# Patient Record
Sex: FEMALE | Race: WHITE | Employment: OTHER | URBAN - METROPOLITAN AREA
[De-identification: names, ages, dates, MRNs, and addresses within clinical notes are randomized per-mention and may not be internally consistent; named-entity substitution may affect disease eponyms.]

---

## 2017-01-18 ENCOUNTER — OFFICE VISIT (OUTPATIENT)
Dept: FAMILY MEDICINE CLINIC | Age: 82
End: 2017-01-18

## 2017-01-18 VITALS
HEART RATE: 63 BPM | OXYGEN SATURATION: 98 % | WEIGHT: 126.4 LBS | BODY MASS INDEX: 23.26 KG/M2 | SYSTOLIC BLOOD PRESSURE: 144 MMHG | TEMPERATURE: 98.3 F | RESPIRATION RATE: 20 BRPM | DIASTOLIC BLOOD PRESSURE: 52 MMHG | HEIGHT: 62 IN

## 2017-01-18 DIAGNOSIS — E78.00 PURE HYPERCHOLESTEROLEMIA: ICD-10-CM

## 2017-01-18 DIAGNOSIS — I10 ESSENTIAL HYPERTENSION: Primary | ICD-10-CM

## 2017-01-18 DIAGNOSIS — E55.9 HYPOVITAMINOSIS D: ICD-10-CM

## 2017-01-18 NOTE — PROGRESS NOTES
Vladimir Pro is a 80 y.o. female  Chief Complaint   Patient presents with    Hypertension     6 months follow up     1. Have you been to the ER, urgent care clinic since your last visit? Hospitalized since your last visit? No    2. Have you seen or consulted any other health care providers outside of the [facility] since your last visit? Include any pap smears or colon screening.  No

## 2017-01-18 NOTE — PROGRESS NOTES
Assessment/Plan:    Sheryl Borges was seen today for hypertension. Diagnoses and all orders for this visit:    Essential hypertension    Pure hypercholesterolemia  -     CBC WITH AUTOMATED DIFF; Future  -     HEPATIC FUNCTION PANEL; Future  -     LIPID PANEL; Future  -     METABOLIC PANEL, BASIC; Future  -     TSH 3RD GENERATION; Future  -     T4, FREE; Future  -     URINALYSIS W/ RFLX MICROSCOPIC; Future  -     VITAMIN D, 25 HYDROXY; Future    Hypovitaminosis D  -     VITAMIN D, 25 HYDROXY; Future    Other orders  -     Cancel: Pneumococcal Conjugate vaccine - 13 valent (50 years and older)  -     Cancel: ADMIN PNEUMOCOCCAL VACCINE  Medicare Injection Admin Charge  -     pneumococcal 13 mey conj dip (PREVNAR-13) 0.5 mL syrg injection; 0.5 mL by IntraMUSCular route once for 1 dose. Pt will return for physical in July 2017. BW prior. The plan was discussed with the patient. The patient verbalized understanding and is in agreement with the plan. All medication potential side effects were discussed with the patient.    -------------------------------------------------------------------------------------------------------------------        Brenda Chaney is a 80 y.o. female and presents with Hypertension (6 months follow up)         Subjective:  Pt here for f/u. Doing well. HTN: well controlled. HLD: stable on labs. ROS:  Constitutional: No recent weight change. No weakness/fatigue. No f/c. Skin: No rashes, change in nails/hair, itching   HENT: No HA, dizziness. No hearing loss/tinnitus. No nasal congestion/discharge. Eyes: No change in vision, double/blurred vision or eye pain/redness. Cardiovascular: No CP/palpitations. No STEWART/orthopnea/PND. Respiratory: No cough/sputum, dyspnea, wheezing. Gastointestinal: No dysphagia, reflux. No n/v. No constipation/diarrhea. No melena/rectal bleeding. Genitourinary: No dysuria, urinary hesitancy, nocturia, hematuria.   No incontinence. Musculoskeletal: No joint pain/stiffness. No muscle pain/tenderness. Endo: No heat/cold intolerance, no polyuria/polydypsia. Heme: No h/o anemia. No easy bleeding/bruising. Allergy/Immunology: No seasonal rhinitis. Denies frequent colds, sinus/ear infections. Neurological: No seizures/numbness/weakness. No paresthesias. Psychiatric:  No depression, anxiety. The problem list was updated as a part of today's visit. 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 SH:  Social History   Substance Use Topics    Smoking status: Never Smoker    Smokeless tobacco: Never Used    Alcohol use No       Medications/Allergies:  Current Outpatient Prescriptions on File Prior to Visit   Medication Sig Dispense Refill    losartan (COZAAR) 100 mg tablet TAKE ONE TABLET BY MOUTH ONCE DAILY 90 Tab 1    sertraline (ZOLOFT) 25 mg tablet Take 1 Tab by mouth daily. 90 Tab 2    sotalol (BETAPACE) 80 mg tablet Take  by mouth daily.  Cholecalciferol, Vitamin D3, 1,000 unit cap Take  by mouth daily.  aspirin delayed-release 81 mg tablet Take  by mouth daily.  omega-3 fatty acids-vitamin e (FISH OIL) 1,000 mg Cap Take 1 Cap by mouth.  calcium-cholecalciferol, D3, (CALCIUM 600 + D) tablet Take 1 Tab by mouth daily.  latanoprost (XALATAN) 0.005 % ophthalmic solution Administer 1 Drop to both eyes nightly.  timolol (TIMOPTIC) 0.25 % ophthalmic solution Administer 1 Drop to both eyes two (2) times a day. No current facility-administered medications on file prior to visit.          No Known Allergies      Health Maintenance:   Health Maintenance   Topic Date Due    DTaP/Tdap/Td series (1 - Tdap) 01/31/1947    GLAUCOMA SCREENING Q2Y  01/31/1991    Pneumococcal 65+ Low/Medium Risk (2 of 2 - PPSV23) 04/16/2014    INFLUENZA AGE 9 TO ADULT  08/01/2016    MEDICARE YEARLY EXAM  07/28/2017    OSTEOPOROSIS SCREENING (DEXA)  Completed    ZOSTER VACCINE AGE 60>  Completed       Objective:  Visit Vitals    /52    Pulse 63    Temp 98.3 °F (36.8 °C) (Oral)    Resp 20    Ht 5' 2\" (1.575 m)    Wt 126 lb 6.4 oz (57.3 kg)    SpO2 98%    BMI 23.12 kg/m2          Nurses notes and VS reviewed. Physical Examination: General appearance - alert, well appearing, and in no distress  Chest - clear to auscultation, no wheezes, rales or rhonchi, symmetric air entry  Heart - normal rate, regular rhythm, normal S1, S2, no murmurs, rubs, clicks or gallops  Abdomen - soft, nontender, nondistended, no masses or organomegaly  Ext - no edema      Labwork and Ancillary Studies:    CBC w/Diff  Lab Results   Component Value Date/Time    WBC 11.7 07/20/2016 10:02 AM    HGB 13.3 07/20/2016 10:02 AM    PLATELET 437 06/01/0162 10:02 AM         Basic Metabolic Profile  Lab Results   Component Value Date/Time    Sodium 142 07/20/2016 10:02 AM    Potassium 5.1 07/20/2016 10:02 AM    Chloride 107 07/20/2016 10:02 AM    CO2 28 07/20/2016 10:02 AM    Anion gap 7 07/20/2016 10:02 AM    Glucose 102 07/20/2016 10:02 AM    BUN 26 07/20/2016 10:02 AM    Creatinine 1.32 07/20/2016 10:02 AM    BUN/Creatinine ratio 20 07/20/2016 10:02 AM    GFR est AA 46 07/20/2016 10:02 AM    GFR est non-AA 38 07/20/2016 10:02 AM    Calcium 9.5 07/20/2016 10:02 AM         LFT  Lab Results   Component Value Date/Time    ALT 22 07/20/2016 10:02 AM    AST 17 07/20/2016 10:02 AM    Alk.  phosphatase 75 07/20/2016 10:02 AM    Bilirubin, direct 0.1 07/20/2016 10:02 AM    Bilirubin, total 0.4 07/20/2016 10:02 AM         Cholesterol  Lab Results   Component Value Date/Time    Cholesterol, total 206 07/20/2016 10:02 AM    HDL Cholesterol 45 07/20/2016 10:02 AM    LDL, calculated 122.2 07/20/2016 10:02 AM    Triglyceride 194 07/20/2016 10:02 AM    CHOL/HDL Ratio 4.6 07/20/2016 10:02 AM

## 2017-01-18 NOTE — MR AVS SNAPSHOT
Visit Information Date & Time Provider Department Dept. Phone Encounter #  
 1/18/2017 10:15 AM Jak Ramesh, 3 [facility] [phone] [id number] Upcoming Health Maintenance Date Due DTaP/Tdap/Td series (1 - Tdap) 1/31/1947 GLAUCOMA SCREENING Q2Y 1/31/1991 Pneumococcal 65+ Low/Medium Risk (2 of 2 - PPSV23) 4/16/2014 INFLUENZA AGE 9 TO ADULT 8/1/2016 MEDICARE YEARLY EXAM 7/28/2017 Allergies as of 1/18/2017  Review Complete On: 1/18/2017 By: Jak Ramesh MD  
 No Known Allergies Current Immunizations  Reviewed on 1/18/2017 Name Date Influenza High Dose Vaccine PF 10/12/2016 Influenza Vaccine 9/23/2015, 12/16/2014, 10/16/2013 Pneumococcal Vaccine (Unspecified Type) 4/16/2009 Zoster Vaccine, Live 9/10/2014 Reviewed by Jak Ramesh MD on 1/18/2017 at 10:23 AM  
You Were Diagnosed With   
  
 Codes Comments Essential hypertension    -  Primary ICD-10-CM: I10 
ICD-9-CM: 401.9 Pure hypercholesterolemia     ICD-10-CM: E78.00 ICD-9-CM: 272.0 Hypovitaminosis D     ICD-10-CM: E55.9 ICD-9-CM: 268.9 Vitals BP Pulse Temp Resp Height(growth percentile) Weight(growth percentile) 144/52 63 98.3 °F (36.8 °C) (Oral) 20 5' 2\" (1.575 m) 126 lb 6.4 oz (57.3 kg) SpO2 BMI OB Status Smoking Status 98% 23.12 kg/m2 Postmenopausal Never Smoker Vitals History BMI and BSA Data Body Mass Index Body Surface Area  
 23.12 kg/m 2 1.58 m 2 Preferred Pharmacy Pharmacy Name Phone [facility] [phone] [facility] [phone] Your Updated Medication List  
  
   
This list is accurate as of: 1/18/17 10:32 AM.  Always use your most recent med list.  
  
  
  
  
 aspirin delayed-release 81 mg tablet Take  by mouth daily. Calcium 600 + D tablet Generic drug:  calcium-cholecalciferol (D3) Take 1 Tab by mouth daily. cholecalciferol 1,000 unit Cap Commonly known as:  VITAMIN D3 Take  by mouth daily. FISH OIL 1,000 mg Cap Generic drug:  omega-3 fatty acids-vitamin e Take 1 Cap by mouth.  
  
 losartan 100 mg tablet Commonly known as:  COZAAR  
TAKE ONE TABLET BY MOUTH ONCE DAILY pneumococcal 13 mey conj dip 0.5 mL Syrg injection Commonly known as:  PREVNAR-13  
0.5 mL by IntraMUSCular route once for 1 dose. sertraline 25 mg tablet Commonly known as:  ZOLOFT Take 1 Tab by mouth daily. sotalol 80 mg tablet Commonly known as:  Delle Lisa Take  by mouth daily. timolol 0.25 % ophthalmic solution Commonly known as:  TIMOPTIC Administer 1 Drop to both eyes two (2) times a day. XALATAN 0.005 % ophthalmic solution Generic drug:  latanoprost  
Administer 1 Drop to both eyes nightly. Prescriptions Printed Refills  
 pneumococcal 13 mey conj dip (PREVNAR-13) 0.5 mL syrg injection 0 Si.5 mL by IntraMUSCular route once for 1 dose. Class: Print Route: IntraMUSCular To-Do List   
 2017 Lab:  CBC WITH AUTOMATED DIFF   
  
 2017 Lab:  HEPATIC FUNCTION PANEL   
  
 2017 Lab:  LIPID PANEL   
  
 2017 Lab:  METABOLIC PANEL, BASIC   
  
 2017 Lab:  T4, FREE   
  
 2017 Lab:  TSH 3RD GENERATION   
  
 2017 Lab:  URINALYSIS W/ RFLX MICROSCOPIC   
  
 2017 Lab:  VITAMIN D, 25 HYDROXY Patient Instructions High Cholesterol: Care Instructions Your Care Instructions Cholesterol is a type of fat in your blood. It is needed for many body functions, such as making new cells. Cholesterol is made by your body. It also comes from food you eat. High cholesterol means that you have too much of the fat in your blood. This raises your risk of a heart attack and stroke. LDL and HDL are part of your total cholesterol.  LDL is the \"bad\" cholesterol. High LDL can raise your risk for heart disease, heart attack, and stroke. HDL is the \"good\" cholesterol. It helps clear bad cholesterol from the body. High HDL is linked with a lower risk of heart disease, heart attack, and stroke. Your cholesterol levels help your doctor find out your risk for having a heart attack or stroke. You and your doctor can talk about whether you need to lower your risk and what treatment is best for you. A heart-healthy lifestyle along with medicines can help lower your cholesterol and your risk. The way you choose to lower your risk will depend on how high your risk is for heart attack and stroke. It will also depend on how you feel about taking medicines. Follow-up care is a key part of your treatment and safety. Be sure to make and go to all appointments, and call your doctor if you are having problems. It's also a good idea to know your test results and keep a list of the medicines you take. How can you care for yourself at home? · Eat a variety of foods every day. Good choices include fruits, vegetables, whole grains (like oatmeal), dried beans and peas, nuts and seeds, soy products (like tofu), and fat-free or low-fat dairy products. · Replace butter, margarine, and hydrogenated or partially hydrogenated oils with olive and canola oils. (Canola oil margarine without trans fat is fine.) · Replace red meat with fish, poultry, and soy protein (like tofu). · Limit processed and packaged foods like chips, crackers, and cookies. · Bake, broil, or steam foods. Don't tripathi them. · Be physically active. Get at least 30 minutes of exercise on most days of the week. Walking is a good choice. You also may want to do other activities, such as running, swimming, cycling, or playing tennis or team sports. · Stay at a healthy weight or lose weight by making the changes in eating and physical activity listed above.  Losing just a small amount of weight, even 5 to 10 pounds, can reduce your risk for having a heart attack or stroke. · Do not smoke. When should you call for help? Watch closely for changes in your health, and be sure to contact your doctor if: 
· You need help making lifestyle changes. · You have questions about your medicine. Where can you learn more? Go to http://deanna-antionette.info/. Enter T876 in the search box to learn more about \"High Cholesterol: Care Instructions. \" Current as of: January 27, 2016 Content Version: 11.1 © 1041-4514 [employer]. Care instructions adapted under license by [facility] (which disclaims liability or warranty for this information). If you have questions about a medical condition or this instruction, always ask your healthcare professional. [address] any warranty or liability for your use of this information. Introducing [facility]! Cristina Day introduces [employer] patient portal. Now you can access parts of your medical record, email your doctor's office, and request medication refills online. 1. In your internet browser, go to [email or web address] 2. Click on the First Time User? Click Here link in the Sign In box. You will see the New Member Sign Up page. 3. Enter your Cubito Access Code exactly as it appears below. You will not need to use this code after youve completed the sign-up process. If you do not sign up before the expiration date, you must request a new code. · [employer] Access Code: [email or web address] Expires: 4/18/2017 10:32 AM 
 
4. Enter the last four digits of your Social Security Number (xxxx) and Date of Birth (mm/dd/yyyy) as indicated and click Submit. You will be taken to the next sign-up page. 5. Create a Cubito ID. This will be your Cubito login ID and cannot be changed, so think of one that is secure and easy to remember. 6. Create a Lover.ly password. You can change your password at any time. 7. Enter your Password Reset Question and Answer. This can be used at a later time if you forget your password. 8. Enter your e-mail address. You will receive e-mail notification when new information is available in [address] 9. Click Sign Up. You can now view and download portions of your medical record. 10. Click the Download Summary menu link to download a portable copy of your medical information. If you have questions, please visit the Frequently Asked Questions section of the [employer] website. Remember, [employer] is NOT to be used for urgent needs. For medical emergencies, dial 911. Now available from your iPhone and Android! Please provide this summary of care documentation to your next provider. Your primary care clinician is listed as Alona 51. If you have any questions after today's visit, please call [phone].

## 2017-01-18 NOTE — PATIENT INSTRUCTIONS

## 2017-01-26 ENCOUNTER — TELEPHONE (OUTPATIENT)
Dept: FAMILY MEDICINE CLINIC | Age: 82
End: 2017-01-26

## 2017-01-27 ENCOUNTER — TELEPHONE (OUTPATIENT)
Dept: FAMILY MEDICINE CLINIC | Age: 82
End: 2017-01-27

## 2017-02-02 ENCOUNTER — TELEPHONE (OUTPATIENT)
Dept: FAMILY MEDICINE CLINIC | Age: 82
End: 2017-02-02

## 2017-02-02 NOTE — TELEPHONE ENCOUNTER
[facility] called in regards to an pneumonia vaccine; Ravin Dayana John is the contact and she will only be at the office till 6 pm; Mrs. Dayana Lopez [phone]

## 2017-06-22 ENCOUNTER — IMPORTED ENCOUNTER (OUTPATIENT)
Dept: URBAN - METROPOLITAN AREA CLINIC 1 | Facility: CLINIC | Age: 82
End: 2017-06-22

## 2017-06-22 PROBLEM — H04.123: Noted: 2017-06-22

## 2017-06-22 PROBLEM — H26.493: Noted: 2017-06-22

## 2017-06-22 PROBLEM — H40.1133: Noted: 2017-06-22

## 2017-06-22 PROBLEM — Z96.1: Noted: 2017-06-22

## 2017-06-22 PROBLEM — H16.143: Noted: 2017-06-22

## 2017-06-22 PROCEDURE — 92012 INTRM OPH EXAM EST PATIENT: CPT

## 2017-06-22 PROCEDURE — 92133 CPTRZD OPH DX IMG PST SGM ON: CPT

## 2017-06-22 NOTE — PATIENT DISCUSSION
1.  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 Advanced thinning by OCT OU but quality of OCT appears relatively poor OU. PT using Cosopt OU BID ok for pt to continue Cosopt OU BID. Pt to continue Latanoprost OU QHS. Patient advised to be compliant with gtts. Condition was discussed with patient and patient understands. Will continue to monitor patient for any progression in condition. Patient was advised to call us with any problems questions or concerns. 2.  PCO OU: Observe and consider yag cap when pt feels pco visually significant and visual acuity decreases to appropriate level. 3. PETER w/ decrease PEK OU- Improving. The continuation of artificial tears were recommended. 4.  Pseudophakia OU- Doing well5. H/o [place] OU6. H/o Gr 1 HTN Ret OU7. Return for an appointment for a 30 in 6 months with Dr. Tessa Carpenter.

## 2017-06-27 ENCOUNTER — TELEPHONE (OUTPATIENT)
Dept: FAMILY MEDICINE CLINIC | Age: 82
End: 2017-06-27

## 2017-06-27 NOTE — TELEPHONE ENCOUNTER
Patient usually have a regular bowel movement, however it doesn't look normal for the last two days. Patient's feeling a little pressure when she's pooping but added that she did not have any abdominal pain. Patient and her daughter would like to know if she could get an over the counter medication for stool softener. Asking to be called back.

## 2017-06-27 NOTE — TELEPHONE ENCOUNTER
Call placed to patient, patient's daughter informed that patient can use OTC colace. If that does not help she can call for f/u appt with Dr Sheyla Sanchez.

## 2017-07-10 ENCOUNTER — HOSPITAL ENCOUNTER (OUTPATIENT)
Dept: LAB | Age: 82
Discharge: HOME | End: 2017-07-10
Payer: MEDICARE

## 2017-07-10 DIAGNOSIS — E78.00 PURE HYPERCHOLESTEROLEMIA: ICD-10-CM

## 2017-07-10 LAB
ALBUMIN SERPL BCP-MCNC: 3.4 G/DL (ref 3.4–5)
ALBUMIN/GLOB SERPL: 0.9 {RATIO} (ref 0.8–1.7)
ALP SERPL-CCNC: 62 U/L (ref 45–117)
ALT SERPL-CCNC: 19 U/L (ref 13–56)
ANION GAP BLD CALC-SCNC: 8 MMOL/L (ref 3–18)
APPEARANCE UR: CLEAR
AST SERPL W P-5'-P-CCNC: 16 U/L (ref 15–37)
BACTERIA URNS QL MICRO: NEGATIVE /HPF
BASOPHILS # BLD AUTO: 0 K/UL (ref 0–0.06)
BASOPHILS # BLD: 0 % (ref 0–2)
BILIRUB DIRECT SERPL-MCNC: <0.1 MG/DL (ref 0–0.2)
BILIRUB SERPL-MCNC: 0.2 MG/DL (ref 0.2–1)
BILIRUB UR QL: NEGATIVE
BUN SERPL-MCNC: 23 MG/DL (ref 7–18)
BUN/CREAT SERPL: 16 (ref 12–20)
CALCIUM SERPL-MCNC: 9 MG/DL (ref 8.5–10.1)
CHLORIDE SERPL-SCNC: 105 MMOL/L (ref 100–108)
CHOLEST SERPL-MCNC: 201 MG/DL
CO2 SERPL-SCNC: 27 MMOL/L (ref 21–32)
COLOR UR: YELLOW
CREAT SERPL-MCNC: 1.48 MG/DL (ref 0.6–1.3)
DIFFERENTIAL METHOD BLD: ABNORMAL
EOSINOPHIL # BLD: 0.1 K/UL (ref 0–0.4)
EOSINOPHIL NFR BLD: 1 % (ref 0–5)
EPITH CASTS URNS QL MICRO: NORMAL /LPF (ref 0–5)
ERYTHROCYTE [DISTWIDTH] IN BLOOD BY AUTOMATED COUNT: 14 % (ref 11.6–14.5)
GLOBULIN SER CALC-MCNC: 3.6 G/DL (ref 2–4)
GLUCOSE SERPL-MCNC: 123 MG/DL (ref 74–99)
GLUCOSE UR STRIP.AUTO-MCNC: NEGATIVE MG/DL
HCT VFR BLD AUTO: 41.6 % (ref 35–45)
HDLC SERPL-MCNC: 47 MG/DL (ref 40–60)
HDLC SERPL: 4.3 {RATIO} (ref 0–5)
HGB BLD-MCNC: 13.4 G/DL (ref 12–16)
HGB UR QL STRIP: NEGATIVE
KETONES UR QL STRIP.AUTO: NEGATIVE MG/DL
LDLC SERPL CALC-MCNC: 123.6 MG/DL (ref 0–100)
LEUKOCYTE ESTERASE UR QL STRIP.AUTO: ABNORMAL
LIPID PROFILE,FLP: ABNORMAL
LYMPHOCYTES # BLD AUTO: 18 % (ref 21–52)
LYMPHOCYTES # BLD: 2.3 K/UL (ref 0.9–3.6)
MCH RBC QN AUTO: 29.6 PG (ref 24–34)
MCHC RBC AUTO-ENTMCNC: 32.2 G/DL (ref 31–37)
MCV RBC AUTO: 91.8 FL (ref 74–97)
MONOCYTES # BLD: 0.9 K/UL (ref 0.05–1.2)
MONOCYTES NFR BLD AUTO: 7 % (ref 3–10)
NEUTS SEG # BLD: 9.4 K/UL (ref 1.8–8)
NEUTS SEG NFR BLD AUTO: 74 % (ref 40–73)
NITRITE UR QL STRIP.AUTO: NEGATIVE
PH UR STRIP: 6 [PH] (ref 5–8)
PLATELET # BLD AUTO: 225 K/UL (ref 135–420)
PMV BLD AUTO: 11.2 FL (ref 9.2–11.8)
POTASSIUM SERPL-SCNC: 4.9 MMOL/L (ref 3.5–5.5)
PROT SERPL-MCNC: 7 G/DL (ref 6.4–8.2)
PROT UR STRIP-MCNC: NEGATIVE MG/DL
RBC # BLD AUTO: 4.53 M/UL (ref 4.2–5.3)
RBC #/AREA URNS HPF: 0 /HPF (ref 0–5)
SODIUM SERPL-SCNC: 140 MMOL/L (ref 136–145)
SP GR UR REFRACTOMETRY: 1.01 (ref 1–1.03)
T4 FREE SERPL-MCNC: 1.1 NG/DL (ref 0.7–1.5)
TRIGL SERPL-MCNC: 152 MG/DL (ref ?–150)
TSH SERPL DL<=0.05 MIU/L-ACNC: 2.93 UIU/ML (ref 0.36–3.74)
UROBILINOGEN UR QL STRIP.AUTO: 0.2 EU/DL (ref 0.2–1)
VLDLC SERPL CALC-MCNC: 30.4 MG/DL
WBC # BLD AUTO: 12.8 K/UL (ref 4.6–13.2)
WBC URNS QL MICRO: NORMAL /HPF (ref 0–4)

## 2017-07-10 PROCEDURE — 84443 ASSAY THYROID STIM HORMONE: CPT | Performed by: INTERNAL MEDICINE

## 2017-07-10 PROCEDURE — 84439 ASSAY OF FREE THYROXINE: CPT | Performed by: INTERNAL MEDICINE

## 2017-07-10 PROCEDURE — 80061 LIPID PANEL: CPT | Performed by: INTERNAL MEDICINE

## 2017-07-10 PROCEDURE — 36415 COLL VENOUS BLD VENIPUNCTURE: CPT | Performed by: INTERNAL MEDICINE

## 2017-07-10 PROCEDURE — 80076 HEPATIC FUNCTION PANEL: CPT | Performed by: INTERNAL MEDICINE

## 2017-07-10 PROCEDURE — 80048 BASIC METABOLIC PNL TOTAL CA: CPT | Performed by: INTERNAL MEDICINE

## 2017-07-10 PROCEDURE — 85025 COMPLETE CBC W/AUTO DIFF WBC: CPT | Performed by: INTERNAL MEDICINE

## 2017-07-10 PROCEDURE — 81001 URINALYSIS AUTO W/SCOPE: CPT | Performed by: INTERNAL MEDICINE

## 2017-07-17 ENCOUNTER — TELEPHONE (OUTPATIENT)
Dept: FAMILY MEDICINE CLINIC | Age: 82
End: 2017-07-17

## 2017-07-17 NOTE — TELEPHONE ENCOUNTER
Caleb Amin from [address] [place] [address]  is requesting to get patient's recent office visit notes and labs. There was no information that patient was ever referred to them, however they have been seeing the patient for quite sometime now. Ms. Caleb Amin added that they send notes every year to us whenever see they see the patient.  Asking to be faxed at [phone]

## 2017-07-19 ENCOUNTER — TELEPHONE (OUTPATIENT)
Dept: FAMILY MEDICINE CLINIC | Age: 82
End: 2017-07-19

## 2017-07-19 ENCOUNTER — OFFICE VISIT (OUTPATIENT)
Dept: FAMILY MEDICINE CLINIC | Age: 82
End: 2017-07-19

## 2017-07-19 VITALS
WEIGHT: 121 LBS | TEMPERATURE: 98.3 F | OXYGEN SATURATION: 96 % | DIASTOLIC BLOOD PRESSURE: 72 MMHG | HEIGHT: 62 IN | HEART RATE: 63 BPM | RESPIRATION RATE: 16 BRPM | SYSTOLIC BLOOD PRESSURE: 178 MMHG | BODY MASS INDEX: 22.26 KG/M2

## 2017-07-19 DIAGNOSIS — I10 ESSENTIAL HYPERTENSION: Primary | ICD-10-CM

## 2017-07-19 DIAGNOSIS — Z13.6 SCREENING FOR ISCHEMIC HEART DISEASE: ICD-10-CM

## 2017-07-19 DIAGNOSIS — Z13.1 SCREENING FOR DIABETES MELLITUS: ICD-10-CM

## 2017-07-19 DIAGNOSIS — Z00.00 ROUTINE GENERAL MEDICAL EXAMINATION AT A HEALTH CARE FACILITY: ICD-10-CM

## 2017-07-19 DIAGNOSIS — Z13.39 SCREENING FOR ALCOHOLISM: ICD-10-CM

## 2017-07-19 DIAGNOSIS — E78.00 PURE HYPERCHOLESTEROLEMIA: ICD-10-CM

## 2017-07-19 NOTE — PROGRESS NOTES
This is a Subsequent Medicare Annual Wellness Visit providing Personalized Prevention Plan Services (PPPS) (Performed 12 months after initial AWV and PPPS )    I have reviewed the patient's medical history in detail and updated the computerized patient record. 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  Cholecalciferol, Vitamin D3, 1,000 unit cap Take  by mouth daily.  aspirin delayed-release 81 mg tablet Take  by mouth daily.  omega-3 fatty acids-vitamin e (FISH OIL) 1,000 mg Cap Take 1 Cap by mouth.  calcium-cholecalciferol, D3, (CALCIUM 600 + D) tablet Take 1 Tab by mouth daily.  latanoprost (XALATAN) 0.005 % ophthalmic solution Administer 1 Drop to both eyes nightly.  timolol (TIMOPTIC) 0.25 % ophthalmic solution Administer 1 Drop to both eyes two (2) times a day.        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 No    Do you average more than 7 drinks per week? No        Functional Ability and Level of Safety:     Hearing Loss   mild    Activities of Daily Living   Self-care. Requires assistance with: no ADLs    Fall Risk   Fall Risk Assessment, last 12 mths 7/19/2017   Able to walk? Yes   Fall in past 12 months? No   Fall with injury? -   Number of falls in past 12 months -     Abuse Screen   Patient is not abused    Review of Systems   Pertinent items are noted in HPI. Physical Examination     Evaluation of Cognitive Function:  Mood/affect:  happy  Appearance: age appropriate  Family member/caregiver input: none    Visit Vitals    /70    Pulse 63    Temp 98.3 °F (36.8 °C)    Resp 16    Ht 5' 2\" (1.575 m)    Wt 121 lb (54.9 kg)    SpO2 96%    BMI 22.13 kg/m2     General appearance: alert, cooperative, no distress, appears stated age  Head: Normocephalic, without obvious abnormality, atraumatic  Ears: normal TM's and external ear canals AU  Throat: Lips, mucosa, and tongue normal. Teeth and gums normal  Neck: supple, symmetrical, trachea midline, no adenopathy, thyroid: not enlarged, symmetric, no tenderness/mass/nodules, no carotid bruit and no JVD  Back: symmetric, no curvature. ROM normal. No CVA tenderness. Lungs: clear to auscultation bilaterally  Heart: regular rate and rhythm, S1, S2 normal, no murmur, click, rub or gallop  Abdomen: soft, non-tender.  Bowel sounds normal. No masses,  no organomegaly  Extremities: extremities normal, atraumatic, no cyanosis or edema  Pulses: 2+ and symmetric    Patient Care Team:  Nay Riddle MD as PCP - General  [facility] Anshul Diaz MD (Cardiology)  Katrina Carroll, LUCI as Ambulatory Care Navigator    Advice/Referrals/Counseling   Education and counseling provided:  Are appropriate based on today's review and evaluation      Assessment/Plan       ICD-10-CM ICD-9-CM    1. Routine general medical examination at a health care facility Z00.00 V70.0    2. Screening for alcoholism Z13.89 V79.1    3. Screening for diabetes mellitus Z13.1 V77.1    4. Screening for ischemic heart disease Z13.6 V81.0    5. Pure hypercholesterolemia E78.00 272.0    6. Essential hypertension I10 401.9    .

## 2017-07-19 NOTE — TELEPHONE ENCOUNTER
Please find out if cards adjusted her BP meds on the visit or not. If her readings do not go down, we will need to add something.

## 2017-07-19 NOTE — PROGRESS NOTES
Assessment/Plan:    Lynnette Mann was seen today for annual wellness visit. Diagnoses and all orders for this visit:    Essential hypertension    Routine general medical examination at a health care facility    Screening for alcoholism    Screening for diabetes mellitus    Screening for ischemic heart disease    Pure hypercholesterolemia        F/u 3 weeks for BP. She will be seeing her cardiologist this afternoon. I advised her to take note of the reading and let us know. Will not change her medications yet. The plan was discussed with the patient. The patient verbalized understanding and is in agreement with the plan. All medication potential side effects were discussed with the patient.    -------------------------------------------------------------------------------------------------------------------        Kitty Finley is a 80 y.o. female and presents with Annual Wellness Visit         Subjective:  Pt here for f/u. HTN: still elevated. HLD; TGs are better this time. Other lipid parameters are fine. ROS:  Constitutional: No recent weight change. No weakness/fatigue. No f/c. Skin: No rashes, change in nails/hair, itching   HENT: No HA, dizziness. No hearing loss/tinnitus. No nasal congestion/discharge. Eyes: No change in vision, double/blurred vision or eye pain/redness. Cardiovascular: No CP/palpitations. No STEWART/orthopnea/PND. Respiratory: No cough/sputum, dyspnea, wheezing. Gastointestinal: No dysphagia, reflux. No n/v. No constipation/diarrhea. No melena/rectal bleeding. Genitourinary: No dysuria, urinary hesitancy, nocturia, hematuria. No incontinence. Musculoskeletal: No joint pain/stiffness. No muscle pain/tenderness. Endo: No heat/cold intolerance, no polyuria/polydypsia. Heme: No h/o anemia. No easy bleeding/bruising. Allergy/Immunology: No seasonal rhinitis. Denies frequent colds, sinus/ear infections. Neurological: No seizures/numbness/weakness.   No paresthesias. Psychiatric:  No depression, anxiety. The problem list was updated as a part of today's visit. 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 SH:  Social History   Substance Use Topics    Smoking status: Never Smoker    Smokeless tobacco: Never Used    Alcohol use No       Medications/Allergies:  Current Outpatient Prescriptions on File Prior to Visit   Medication Sig Dispense Refill    sertraline (ZOLOFT) 25 mg tablet TAKE ONE TABLET BY MOUTH ONCE DAILY 90 Tab 1    losartan (COZAAR) 100 mg tablet TAKE ONE TABLET BY MOUTH ONCE DAILY 90 Tab 1    sotalol (BETAPACE) 80 mg tablet Take  by mouth daily.  Cholecalciferol, Vitamin D3, 1,000 unit cap Take  by mouth daily.  aspirin delayed-release 81 mg tablet Take  by mouth daily.  omega-3 fatty acids-vitamin e (FISH OIL) 1,000 mg Cap Take 1 Cap by mouth.  calcium-cholecalciferol, D3, (CALCIUM 600 + D) tablet Take 1 Tab by mouth daily.  latanoprost (XALATAN) 0.005 % ophthalmic solution Administer 1 Drop to both eyes nightly.  timolol (TIMOPTIC) 0.25 % ophthalmic solution Administer 1 Drop to both eyes two (2) times a day. No current facility-administered medications on file prior to visit.          No Known Allergies      Health Maintenance:   Health Maintenance   Topic Date Due    GLAUCOMA SCREENING Q2Y  01/31/1991    MEDICARE YEARLY EXAM  07/28/2017    INFLUENZA AGE 9 TO ADULT  08/01/2017    DTaP/Tdap/Td series (2 - Td) 07/19/2027    OSTEOPOROSIS SCREENING (DEXA)  Completed    ZOSTER VACCINE AGE 60>  Completed    Pneumococcal 65+ Low/Medium Risk  Completed Objective:  Visit Vitals    /72    Pulse 63    Temp 98.3 °F (36.8 °C)    Resp 16    Ht 5' 2\" (1.575 m)    Wt 121 lb (54.9 kg)    SpO2 96%    BMI 22.13 kg/m2          Nurses notes and VS reviewed. Physical Examination: see other note. Labwork and Ancillary Studies:    CBC w/Diff  Lab Results   Component Value Date/Time    WBC 12.8 07/10/2017 09:12 AM    HGB 13.4 07/10/2017 09:12 AM    PLATELET 046 53/96/7585 09:12 AM         Basic Metabolic Profile  Lab Results   Component Value Date/Time    Sodium 140 07/10/2017 09:12 AM    Potassium 4.9 07/10/2017 09:12 AM    Chloride 105 07/10/2017 09:12 AM    CO2 27 07/10/2017 09:12 AM    Anion gap 8 07/10/2017 09:12 AM    Glucose 123 07/10/2017 09:12 AM    BUN 23 07/10/2017 09:12 AM    Creatinine 1.48 07/10/2017 09:12 AM    BUN/Creatinine ratio 16 07/10/2017 09:12 AM    GFR est AA 40 07/10/2017 09:12 AM    GFR est non-AA 33 07/10/2017 09:12 AM    Calcium 9.0 07/10/2017 09:12 AM         LFT  Lab Results   Component Value Date/Time    ALT (SGPT) 19 07/10/2017 09:12 AM    AST (SGOT) 16 07/10/2017 09:12 AM    Alk.  phosphatase 62 07/10/2017 09:12 AM    Bilirubin, direct <0.1 07/10/2017 09:12 AM    Bilirubin, total 0.2 07/10/2017 09:12 AM         Cholesterol  Lab Results   Component Value Date/Time    Cholesterol, total 201 07/10/2017 09:12 AM    HDL Cholesterol 47 07/10/2017 09:12 AM    LDL, calculated 123.6 07/10/2017 09:12 AM    Triglyceride 152 07/10/2017 09:12 AM    CHOL/HDL Ratio 4.3 07/10/2017 09:12 AM

## 2017-07-19 NOTE — TELEPHONE ENCOUNTER
Pt's daughter called on behalf of the pt to report her bp readings. They took her bp readings after she was done with her Cardiologist at 2:55pm it was 186/72 slightly higher than when she was here with us this morning. Dr Kiera Quintero will be sending DR Cruz Oppenheim a letter about it as well.      He has her scheduled for an echocardiogram for this Tuesday, July [facility]th at [facility]

## 2017-07-19 NOTE — MR AVS SNAPSHOT
Visit Information Date & Time Provider Department Dept. Phone Encounter #  
 7/19/2017 11:00 AM Raj Woodard, [facility] [phone] [id number] Follow-up Instructions Return in about 6 months (around 1/19/2018). Upcoming Health Maintenance Date Due  
 GLAUCOMA SCREENING Q2Y 1/31/1991 MEDICARE YEARLY EXAM 7/28/2017 INFLUENZA AGE 9 TO ADULT 8/1/2017 DTaP/Tdap/Td series (2 - Td) 7/19/2027 Allergies as of 7/19/2017  Review Complete On: 7/19/2017 By: Raj Woodard MD  
 No Known Allergies Current Immunizations  Reviewed on 1/18/2017 Name Date Influenza High Dose Vaccine PF 10/12/2016 Influenza Vaccine 9/23/2015, 12/16/2014, 10/16/2013 Pneumococcal Conjugate (PCV-13) 1/18/2017 Pneumococcal Vaccine (Unspecified Type) 4/16/2009 Zoster Vaccine, Live 9/10/2014 Not reviewed this visit You Were Diagnosed With   
  
 Codes Comments Routine general medical examination at a health care facility     ICD-10-CM: Z00.00 ICD-9-CM: V70.0 Screening for alcoholism     ICD-10-CM: Z13.89 ICD-9-CM: V79.1 Screening for diabetes mellitus     ICD-10-CM: Z13.1 ICD-9-CM: V77.1 Screening for ischemic heart disease     ICD-10-CM: Z13.6 ICD-9-CM: V81.0 Vitals BP Pulse Temp Resp Height(growth percentile) Weight(growth percentile) 180/70 63 98.3 °F (36.8 °C) 16 5' 2\" (1.575 m) 121 lb (54.9 kg) SpO2 BMI OB Status Smoking Status 96% 22.13 kg/m2 Postmenopausal Never Smoker Vitals History BMI and BSA Data Body Mass Index Body Surface Area  
 22.13 kg/m 2 1.55 m 2 Preferred Pharmacy Pharmacy Name Phone [facility] [address] [facility] [phone] Your Updated Medication List  
  
   
This list is accurate as of: 7/19/17 11:43 AM.  Always use your most recent med list.  
  
  
  
  
 aspirin delayed-release 81 mg tablet Take  by mouth daily. Calcium 600 + D tablet Generic drug:  calcium-cholecalciferol (D3) Take 1 Tab by mouth daily. cholecalciferol 1,000 unit Cap Commonly known as:  VITAMIN D3 Take  by mouth daily. FISH OIL 1,000 mg Cap Generic drug:  omega-3 fatty acids-vitamin e Take 1 Cap by mouth.  
  
 losartan 100 mg tablet Commonly known as:  COZAAR  
TAKE ONE TABLET BY MOUTH ONCE DAILY  
  
 sertraline 25 mg tablet Commonly known as:  ZOLOFT  
TAKE ONE TABLET BY MOUTH ONCE DAILY  
  
 sotalol 80 mg tablet Commonly known as:  Geno Showers Take  by mouth daily. timolol 0.25 % ophthalmic solution Commonly known as:  TIMOPTIC Administer 1 Drop to both eyes two (2) times a day. XALATAN 0.005 % ophthalmic solution Generic drug:  latanoprost  
Administer 1 Drop to both eyes nightly. Follow-up Instructions Return in about 6 months (around 1/19/2018). Patient Instructions Medicare Part B Preventive Services Limitations Recommendation Scheduled Bone Mass Measurement 
(age 72 & older, biennial) Requires diagnosis related to osteoporosis or estrogen deficiency. Biennial benefit unless patient has history of long-term glucocorticoid tx or baseline is needed because initial test was by other method Cardiovascular Screening Blood Tests (every 5 years) Total cholesterol, HDL, Triglycerides Order as a panel if possible Colorectal Cancer Screening 
-Fecal occult blood test (annual) -Flexible sigmoidoscopy (5y) 
-Screening colonoscopy (10y) -Barium Enema Counseling to Prevent Tobacco Use (up to 8 sessions per year) - Counseling greater than 3 and up to 10 minutes - Counseling greater than 10 minutes Patients must be asymptomatic of tobacco-related conditions to receive as preventive service Diabetes Screening Tests (at least every 3 years, Medicare covers annually or at 6-month intervals for prediabetic patients) Fasting blood sugar (FBS) or glucose tolerance test (GTT) Patient must be diagnosed with one of the following: 
-Hypertension, Dyslipidemia, obesity, previous impaired FBS or GTT 
Or any two of the following: overweight, FH of diabetes, age ? 72, history of gestational diabetes, birth of baby weighing more than 9 pounds Diabetes Self-Management Training (DSMT) (no USPSTF recommendation) Requires referral by treating physician for patient with diabetes or renal disease. 10 hours of initial DSMT session of no less than 30 minutes each in a continuous 12-month period. 2 hours of follow-up DSMT in subsequent years. Glaucoma Screening (no USPSTF recommendation) Diabetes mellitus, family history, , age 48 or over,  American, age 72 or over Human Immunodeficiency Virus (HIV) Screening (annually for increased risk patients) HIV-1 and HIV-2 by EIA, FREYA, rapid antibody test, or oral mucosa transudate Patient must be at increased risk for HIV infection per USPSTF guidelines or pregnant. Tests covered annually for patients at increased risk. Pregnant patients may receive up to 3 test during pregnancy. Medical Nutrition Therapy (MNT) (for diabetes or renal disease not recommended schedule) Requires referral by treating physician for patient with diabetes or renal disease. Can be provided in same year as diabetes self-management training (DSMT), and CMS recommends medical nutrition therapy take place after DSMT. Up to 3 hours for initial year and 2 hours in subsequent years. Shingles Vaccination A shingles vaccine is also recommended once in a lifetime after age 61 Seasonal Influenza Vaccination (annually) Pneumococcal Vaccination (once after 65) Hepatitis B Vaccinations (if medium/high risk) Medium/high risk factors:  End-stage renal disease, Hemophiliacs who received Factor VIII or IX concentrates, Clients of institutions for the mentally retarded, Persons who live in the same house as a HepB virus carrier, Homosexual men, Illicit injectable drug abusers. Screening Mammography (biennial age 54-69) Annually (age 36 or over) Screening Pap Tests and Pelvic Examination (up to age 79 and after 79 if unknown history or abnormal study last 10 years) Every 24 months except high risk Ultrasound Screening for Abdominal Aortic Aneurysm (AAA) (once) Patient must be referred through [facility] and not have had a screening for abdominal aortic aneurysm before under Medicare. Limited to patients who meet one of the following criteria: 
- Men who are 73-68 years old and have smoked more than 100 cigarettes in their lifetime. 
-Anyone with a FH of AAA 
-Anyone recommended for screening by USPSTF Introducing [facility]! [employer] introduces [employer] patient portal. Now you can access parts of your medical record, email your doctor's office, and request medication refills online. 1. In your internet browser, go to [email or web address] 2. Click on the First Time User? Click Here link in the Sign In box. You will see the New Member Sign Up page. 3. Enter your [employer] Access Code exactly as it appears below. You will not need to use this code after youve completed the sign-up process. If you do not sign up before the expiration date, you must request a new code. · [employer] Access Code: [email or web address] Expires: 10/17/2017 11:43 AM 
 
4. Enter the last four digits of your Social Security Number (xxxx) and Date of Birth (mm/dd/yyyy) as indicated and click Submit. You will be taken to the next sign-up page. 5. Create a Allurentt ID. This will be your [employer] login ID and cannot be changed, so think of one that is secure and easy to remember. 6. Create a [employer] password. You can change your password at any time. 7. Enter your Password Reset Question and Answer.  This can be used at a later time if you forget your password. 8. Enter your e-mail address. You will receive e-mail notification when new information is available in [address] 9. Click Sign Up. You can now view and download portions of your medical record. 10. Click the Download Summary menu link to download a portable copy of your medical information. If you have questions, please visit the Frequently Asked Questions section of the [employer] website. Remember, [employer] is NOT to be used for urgent needs. For medical emergencies, dial 911. Now available from your iPhone and Android! Please provide this summary of care documentation to your next provider. Your primary care clinician is listed as Alona [address]. If you have any questions after today's visit, please call [phone].

## 2017-07-19 NOTE — PATIENT INSTRUCTIONS
Medicare Part B Preventive Services Limitations Recommendation Scheduled   Bone Mass Measurement  (age 72 & older, biennial) Requires diagnosis related to osteoporosis or estrogen deficiency. Biennial benefit unless patient has history of long-term glucocorticoid tx or baseline is needed because initial test was by other method     Cardiovascular Screening Blood Tests (every 5 years)  Total cholesterol, HDL, Triglycerides Order as a panel if possible     Colorectal Cancer Screening  -Fecal occult blood test (annual)  -Flexible sigmoidoscopy (5y)  -Screening colonoscopy (10y)  -Barium Enema      Counseling to Prevent Tobacco Use (up to 8 sessions per year)  - Counseling greater than 3 and up to 10 minutes  - Counseling greater than 10 minutes Patients must be asymptomatic of tobacco-related conditions to receive as preventive service     Diabetes Screening Tests (at least every 3 years, Medicare covers annually or at 6-month intervals for prediabetic patients)    Fasting blood sugar (FBS) or glucose tolerance test (GTT) Patient must be diagnosed with one of the following:  -Hypertension, Dyslipidemia, obesity, previous impaired FBS or GTT  Or any two of the following: overweight, FH of diabetes, age ? 72, history of gestational diabetes, birth of baby weighing more than 9 pounds     Diabetes Self-Management Training (DSMT) (no USPSTF recommendation) Requires referral by treating physician for patient with diabetes or renal disease. 10 hours of initial DSMT session of no less than 30 minutes each in a continuous 12-month period. 2 hours of follow-up DSMT in subsequent years.      Glaucoma Screening (no USPSTF recommendation) Diabetes mellitus, family history, , age 48 or over,  American, age 72 or over     Human Immunodeficiency Virus (HIV) Screening (annually for increased risk patients)  HIV-1 and HIV-2 by EIA, FREYA, rapid antibody test, or oral mucosa transudate Patient must be at increased risk for HIV infection per USPSTF guidelines or pregnant. Tests covered annually for patients at increased risk. Pregnant patients may receive up to 3 test during pregnancy. Medical Nutrition Therapy (MNT) (for diabetes or renal disease not recommended schedule) Requires referral by treating physician for patient with diabetes or renal disease. Can be provided in same year as diabetes self-management training (DSMT), and CMS recommends medical nutrition therapy take place after DSMT. Up to 3 hours for initial year and 2 hours in subsequent years. Shingles Vaccination A shingles vaccine is also recommended once in a lifetime after age 61     Seasonal Influenza Vaccination (annually)      Pneumococcal Vaccination (once after 72)      Hepatitis B Vaccinations (if medium/high risk) Medium/high risk factors:  End-stage renal disease,  Hemophiliacs who received Factor VIII or IX concentrates, Clients of institutions for the mentally retarded, Persons who live in the same house as a HepB virus carrier, Homosexual men, Illicit injectable drug abusers. Screening Mammography (biennial age 54-69) Annually (age 36 or over)     Screening Pap Tests and Pelvic Examination (up to age 79 and after 79 if unknown history or abnormal study last 10 years) Every 25 months except high risk     Ultrasound Screening for Abdominal Aortic Aneurysm (AAA) (once) Patient must be referred through [facility] and not have had a screening for abdominal aortic aneurysm before under Medicare.   Limited to patients who meet one of the following criteria:  - Men who are 73-68 years old and have smoked more than 100 cigarettes in their lifetime.  -Anyone with a FH of AAA  -Anyone recommended for screening by USPSTF

## 2017-07-19 NOTE — PROGRESS NOTES
Chin Powell is a 80 y.o. female here today for medicare wellness visit. 1. Have you been to the ER, urgent care clinic since your last visit? Hospitalized since your last visit? No    2. Have you seen or consulted any other health care providers outside of the [address] [place] [address] since your last visit? Include any pap smears or colon screening.  No

## 2017-07-20 NOTE — TELEPHONE ENCOUNTER
Call placed to patient, they have not done anything with patient's med, but patient is going for echo Tuesday and MD will notify you of results. Patient to see you August 9.

## 2017-08-09 ENCOUNTER — OFFICE VISIT (OUTPATIENT)
Dept: FAMILY MEDICINE CLINIC | Age: 82
End: 2017-08-09

## 2017-08-09 VITALS
HEIGHT: 62 IN | SYSTOLIC BLOOD PRESSURE: 132 MMHG | OXYGEN SATURATION: 97 % | HEART RATE: 60 BPM | DIASTOLIC BLOOD PRESSURE: 54 MMHG | TEMPERATURE: 98.1 F | RESPIRATION RATE: 18 BRPM | WEIGHT: 120 LBS | BODY MASS INDEX: 22.08 KG/M2

## 2017-08-09 DIAGNOSIS — I10 ESSENTIAL HYPERTENSION: Primary | ICD-10-CM

## 2017-08-09 NOTE — PROGRESS NOTES
Assessment/Plan:    *Diagnoses and all orders for this visit:    1. Essential hypertension      F/u in mid Jan for 6 mon f/u. The plan was discussed with the patient. The patient verbalized understanding and is in agreement with the plan. All medication potential side effects were discussed with the patient.    -------------------------------------------------------------------------------------------------------------------        Julissa Ospina is a 80 y.o. female and presents with Hypertension         Subjective:  Pt here for f/u of HTN. She brought BP log from home and readings are even lower. ROS:  Constitutional: No recent weight change. No weakness/fatigue. No f/c. Skin: No rashes, change in nails/hair, itching   HENT: No HA, dizziness. No hearing loss/tinnitus. No nasal congestion/discharge. Eyes: No change in vision, double/blurred vision or eye pain/redness. Cardiovascular: No CP/palpitations. No STEWART/orthopnea/PND. Respiratory: No cough/sputum, dyspnea, wheezing. Gastointestinal: No dysphagia, reflux. No n/v. No constipation/diarrhea. No melena/rectal bleeding. Genitourinary: No dysuria, urinary hesitancy, nocturia, hematuria. No incontinence. Musculoskeletal: No joint pain/stiffness. No muscle pain/tenderness. Endo: No heat/cold intolerance, no polyuria/polydypsia. Heme: No h/o anemia. No easy bleeding/bruising. Allergy/Immunology: No seasonal rhinitis. Denies frequent colds, sinus/ear infections. Neurological: No seizures/numbness/weakness. No paresthesias. Psychiatric:  No depression, anxiety. The problem list was updated as a part of today's visit.   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 SH:  Social History   Substance Use Topics    Smoking status: Never Smoker    Smokeless tobacco: Never Used    Alcohol use No       Medications/Allergies:  Current Outpatient Prescriptions on File Prior to Visit   Medication Sig Dispense Refill    sertraline (ZOLOFT) 25 mg tablet TAKE ONE TABLET BY MOUTH ONCE DAILY 90 Tab 1    losartan (COZAAR) 100 mg tablet TAKE ONE TABLET BY MOUTH ONCE DAILY 90 Tab 1    sotalol (BETAPACE) 80 mg tablet Take  by mouth daily.  Cholecalciferol, Vitamin D3, 1,000 unit cap Take  by mouth daily.  aspirin delayed-release 81 mg tablet Take  by mouth daily.  omega-3 fatty acids-vitamin e (FISH OIL) 1,000 mg Cap Take 1 Cap by mouth.  calcium-cholecalciferol, D3, (CALCIUM 600 + D) tablet Take 1 Tab by mouth daily.  latanoprost (XALATAN) 0.005 % ophthalmic solution Administer 1 Drop to both eyes nightly.  timolol (TIMOPTIC) 0.25 % ophthalmic solution Administer 1 Drop to both eyes two (2) times a day. No current facility-administered medications on file prior to visit. No Known Allergies      Health Maintenance:   Health Maintenance   Topic Date Due    GLAUCOMA SCREENING Q2Y  01/31/1991    INFLUENZA AGE 9 TO ADULT  08/01/2017    MEDICARE YEARLY EXAM  07/20/2018    DTaP/Tdap/Td series (2 - Td) 07/19/2027    OSTEOPOROSIS SCREENING (DEXA)  Completed    ZOSTER VACCINE AGE 60>  Completed    Pneumococcal 65+ Low/Medium Risk  Completed       Objective:  Visit Vitals    /60    Pulse 60    Temp 98.1 °F (36.7 °C) (Oral)    Resp 18    Ht 5' 2\" (1.575 m)    Wt 120 lb (54.4 kg)    SpO2 97%    BMI 21.95 kg/m2          Nurses notes and VS reviewed.       Physical Examination: General appearance - alert, well appearing, and in no distress  Chest - clear to auscultation, no wheezes, rales or rhonchi, symmetric air entry  Heart - normal rate, regular rhythm, normal S1, S2, no murmurs, rubs, clicks or gallops        Labwork and Ancillary Studies:    CBC w/Diff  Lab Results   Component Value Date/Time    WBC 12.8 07/10/2017 09:12 AM    HGB 13.4 07/10/2017 09:12 AM    PLATELET 454 06/74/3142 09:12 AM         Basic Metabolic Profile  Lab Results   Component Value Date/Time    Sodium 140 07/10/2017 09:12 AM    Potassium 4.9 07/10/2017 09:12 AM    Chloride 105 07/10/2017 09:12 AM    CO2 27 07/10/2017 09:12 AM    Anion gap 8 07/10/2017 09:12 AM    Glucose 123 07/10/2017 09:12 AM    BUN 23 07/10/2017 09:12 AM    Creatinine 1.48 07/10/2017 09:12 AM    BUN/Creatinine ratio 16 07/10/2017 09:12 AM    GFR est AA 40 07/10/2017 09:12 AM    GFR est non-AA 33 07/10/2017 09:12 AM    Calcium 9.0 07/10/2017 09:12 AM         LFT  Lab Results   Component Value Date/Time    ALT (SGPT) 19 07/10/2017 09:12 AM    AST (SGOT) 16 07/10/2017 09:12 AM    Alk.  phosphatase 62 07/10/2017 09:12 AM    Bilirubin, direct <0.1 07/10/2017 09:12 AM    Bilirubin, total 0.2 07/10/2017 09:12 AM         Cholesterol  Lab Results   Component Value Date/Time    Cholesterol, total 201 07/10/2017 09:12 AM    HDL Cholesterol 47 07/10/2017 09:12 AM    LDL, calculated 123.6 07/10/2017 09:12 AM    Triglyceride 152 07/10/2017 09:12 AM    CHOL/HDL Ratio 4.3 07/10/2017 09:12 AM

## 2017-08-09 NOTE — PROGRESS NOTES
Oswaldo Treadwell is a 80 y.o. female  Patient here for follow up for HTN. 1. Have you been to the ER, urgent care clinic since your last visit? Hospitalized since your last visit? No    2. Have you seen or consulted any other health care providers outside of the [facility] since your last visit? Include any pap smears or colon screening.  No

## 2017-08-09 NOTE — PATIENT INSTRUCTIONS

## 2017-08-09 NOTE — MR AVS SNAPSHOT
Visit Information Date & Time Provider Department Dept. Phone Encounter #  
 8/9/2017 10:45 AM Nay Riddle MD [employer] [phone] [id number] Upcoming Health Maintenance Date Due  
 GLAUCOMA SCREENING Q2Y 1/31/1991 INFLUENZA AGE 9 TO ADULT 8/1/2017 MEDICARE YEARLY EXAM 7/20/2018 DTaP/Tdap/Td series (2 - Td) 7/19/2027 Allergies as of 8/9/2017  Review Complete On: 8/9/2017 By: Nay Riddle MD  
 No Known Allergies Current Immunizations  Reviewed on 1/18/2017 Name Date Influenza High Dose Vaccine PF 10/12/2016 Influenza Vaccine 9/23/2015, 12/16/2014, 10/16/2013 Pneumococcal Conjugate (PCV-13) 1/18/2017 Pneumococcal Vaccine (Unspecified Type) 4/16/2009 Zoster Vaccine, Live 9/10/2014 Not reviewed this visit You Were Diagnosed With   
  
 Codes Comments Essential hypertension    -  Primary ICD-10-CM: I10 
ICD-9-CM: 401.9 Vitals BP Pulse Temp Resp Height(growth percentile) Weight(growth percentile) 148/60 60 98.1 °F (36.7 °C) (Oral) 18 5' 2\" (1.575 m) 120 lb (54.4 kg) SpO2 BMI OB Status Smoking Status 97% 21.95 kg/m2 Postmenopausal Never Smoker Vitals History BMI and BSA Data Body Mass Index Body Surface Area  
 21.95 kg/m 2 1.54 m 2 Preferred Pharmacy Pharmacy Name Phone [phone] [facility] [phone] Your Updated Medication List  
  
   
This list is accurate as of: 8/9/17 11:32 AM.  Always use your most recent med list.  
  
  
  
  
 aspirin delayed-release 81 mg tablet Take  by mouth daily. Calcium 600 + D tablet Generic drug:  calcium-cholecalciferol (D3) Take 1 Tab by mouth daily. cholecalciferol 1,000 unit Cap Commonly known as:  VITAMIN D3 Take  by mouth daily. FISH OIL 1,000 mg Cap Generic drug:  omega-3 fatty acids-vitamin e Take 1 Cap by mouth.  
  
 losartan 100 mg tablet Commonly known as:  COZAAR  
TAKE ONE TABLET BY MOUTH ONCE DAILY  
  
 sertraline 25 mg tablet Commonly known as:  ZOLOFT  
TAKE ONE TABLET BY MOUTH ONCE DAILY  
  
 sotalol 80 mg tablet Commonly known as:  Demetri Burkland Take  by mouth daily. timolol 0.25 % ophthalmic solution Commonly known as:  TIMOPTIC Administer 1 Drop to both eyes two (2) times a day. XALATAN 0.005 % ophthalmic solution Generic drug:  latanoprost  
Administer 1 Drop to both eyes nightly. Patient Instructions High Blood Pressure: Care Instructions Your Care Instructions If your blood pressure is usually above 140/90, you have high blood pressure, or hypertension. That means the top number is 140 or higher or the bottom number is 90 or higher, or both. Despite what a lot of people think, high blood pressure usually doesn't cause headaches or make you feel dizzy or lightheaded. It usually has no symptoms. But it does increase your risk for heart attack, stroke, and kidney or eye damage. The higher your blood pressure, the more your risk increases. Your doctor will give you a goal for your blood pressure. Your goal will be based on your health and your age. An example of a goal is to keep your blood pressure below 140/90. Lifestyle changes, such as eating healthy and being active, are always important to help lower blood pressure. You might also take medicine to reach your blood pressure goal. 
Follow-up care is a key part of your treatment and safety. Be sure to make and go to all appointments, and call your doctor if you are having problems. It's also a good idea to know your test results and keep a list of the medicines you take. How can you care for yourself at home? Medical treatment · If you stop taking your medicine, your blood pressure will go back up. You may take one or more types of medicine to lower your blood pressure. Be safe with medicines. Take your medicine exactly as prescribed. Call your doctor if you think you are having a problem with your medicine. · Talk to your doctor before you start taking aspirin every day. Aspirin can help certain people lower their risk of a heart attack or stroke. But taking aspirin isn't right for everyone, because it can cause serious bleeding. · See your doctor regularly. You may need to see the doctor more often at first or until your blood pressure comes down. · If you are taking blood pressure medicine, talk to your doctor before you take decongestants or anti-inflammatory medicine, such as ibuprofen. Some of these medicines can raise blood pressure. · Learn how to check your blood pressure at home. Lifestyle changes · Stay at a healthy weight. This is especially important if you put on weight around the waist. Losing even 10 pounds can help you lower your blood pressure. · If your doctor recommends it, get more exercise. Walking is a good choice. Bit by bit, increase the amount you walk every day. Try for at least 30 minutes on most days of the week. You also may want to swim, bike, or do other activities. · Avoid or limit alcohol. Talk to your doctor about whether you can drink any alcohol. · Try to limit how much sodium you eat to less than 2,300 milligrams (mg) a day. Your doctor may ask you to try to eat less than 1,500 mg a day. · Eat plenty of fruits (such as bananas and oranges), vegetables, legumes, whole grains, and low-fat dairy products. · Lower the amount of saturated fat in your diet. Saturated fat is found in animal products such as milk, cheese, and meat. Limiting these foods may help you lose weight and also lower your risk for heart disease. · Do not smoke. Smoking increases your risk for heart attack and stroke. If you need help quitting, talk to your doctor about stop-smoking programs and medicines. These can increase your chances of quitting for good. When should you call for help? Call 911 anytime you think you may need emergency care. This may mean having symptoms that suggest that your blood pressure is causing a serious heart or blood vessel problem. Your blood pressure may be over 180/110. For example, call 911 if: 
· You have symptoms of a heart attack. These may include: ¨ Chest pain or pressure, or a strange feeling in the chest. 
¨ Sweating. ¨ Shortness of breath. ¨ Nausea or vomiting. ¨ Pain, pressure, or a strange feeling in the back, neck, jaw, or upper belly or in one or both shoulders or arms. ¨ Lightheadedness or sudden weakness. ¨ A fast or irregular heartbeat. · You have symptoms of a stroke. These may include: 
¨ Sudden numbness, tingling, weakness, or loss of movement in your face, arm, or leg, especially on only one side of your body. ¨ Sudden vision changes. ¨ Sudden trouble speaking. ¨ Sudden confusion or trouble understanding simple statements. ¨ Sudden problems with walking or balance. ¨ A sudden, severe headache that is different from past headaches. · You have severe back or belly pain. Do not wait until your blood pressure comes down on its own. Get help right away. Call your doctor now or seek immediate care if: 
· Your blood pressure is much higher than normal (such as 180/110 or higher), but you don't have symptoms. · You think high blood pressure is causing symptoms, such as: ¨ Severe headache. ¨ Blurry vision. Watch closely for changes in your health, and be sure to contact your doctor if: 
· Your blood pressure measures 140/90 or higher at least 2 times. That means the top number is 140 or higher or the bottom number is 90 or higher, or both. · You think you may be having side effects from your blood pressure medicine. · Your blood pressure is usually normal, but it goes above normal at least 2 times. Where can you learn more? Go to http://deanna-antionette.info/. Enter C283 in the search box to learn more about \"High Blood Pressure: Care Instructions. \" Current as of: August 8, 2016 Content Version: 11.3 © 3715-6570 [employer], mobifriends. Care instructions adapted under license by [employer] (which disclaims liability or warranty for this information). If you have questions about a medical condition or this instruction, always ask your healthcare professional. [address] any warranty or liability for your use of this information. Introducing [facility]! [facility] introduces AgeCheq patient portal. Now you can access parts of your medical record, email your doctor's office, and request medication refills online. 1. In your internet browser, go to [email or web address] 2. Click on the First Time User? Click Here link in the Sign In box. You will see the New Member Sign Up page. 3. Enter your AgeCheq Access Code exactly as it appears below. You will not need to use this code after youve completed the sign-up process. If you do not sign up before the expiration date, you must request a new code. · AgeCheq Access Code: [email or web address] Expires: 10/17/2017 11:43 AM 
 
4. Enter the last four digits of your Social Security Number (xxxx) and Date of Birth (mm/dd/yyyy) as indicated and click Submit. You will be taken to the next sign-up page. 5. Create a AgeCheq ID. This will be your AgeCheq login ID and cannot be changed, so think of one that is secure and easy to remember. 6. Create a AgeCheq password. You can change your password at any time. 7. Enter your Password Reset Question and Answer. This can be used at a later time if you forget your password. 8. Enter your e-mail address. You will receive e-mail notification when new information is available in [address] 9. Click Sign Up. You can now view and download portions of your medical record. 10. Click the Download Summary menu link to download a portable copy of your medical information. If you have questions, please visit the Frequently Asked Questions section of the Budge website. Remember, Budge is NOT to be used for urgent needs. For medical emergencies, dial 911. Now available from your iPhone and Android! Please provide this summary of care documentation to your next provider. Your primary care clinician is listed as [address]. If you have any questions after today's visit, please call [phone].

## 2017-10-31 RX ORDER — SERTRALINE HYDROCHLORIDE 25 MG/1
TABLET, FILM COATED ORAL
Qty: 90 TAB | Refills: 1 | Status: SHIPPED | OUTPATIENT
Start: 2017-10-31 | End: 2018-04-26 | Stop reason: SDUPTHER

## 2017-12-12 ENCOUNTER — IMPORTED ENCOUNTER (OUTPATIENT)
Dept: URBAN - METROPOLITAN AREA CLINIC 1 | Facility: CLINIC | Age: 82
End: 2017-12-12

## 2017-12-12 PROBLEM — Z96.1: Noted: 2017-12-12

## 2017-12-12 PROBLEM — H40.1133: Noted: 2017-12-12

## 2017-12-12 PROBLEM — H16.143: Noted: 2017-12-12

## 2017-12-12 PROBLEM — H35.033: Noted: 2017-12-12

## 2017-12-12 PROBLEM — H26.493: Noted: 2017-12-12

## 2017-12-12 PROBLEM — H04.123: Noted: 2017-12-12

## 2017-12-12 PROBLEM — H35.363: Noted: 2017-12-12

## 2017-12-12 PROCEDURE — 92014 COMPRE OPH EXAM EST PT 1/>: CPT

## 2017-12-12 NOTE — PATIENT DISCUSSION
1.  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 Continue Cosopt BID OU and Latanoprost OU QHS. Patient advised to be compliant with gtts. Condition was discussed with patient and patient understands. Will continue to monitor patient for any progression in condition. Patient was advised to call us with any problems questions or concerns. 2.  PCO OU: (Posterior Capsule Opacification)   Observe 3. Pseudophakia OU 4. GR I Hypertensive Retinopathy OU- Stable continue HTN Control5. PETER w/ PEK OU- Cont BID OU Routinely. 6.  Macular Drusen OU- stable observe. Letter to PCP Return for an appointment in 6 mo 10 OCT with Dr. Dari Humphrey.

## 2018-01-24 ENCOUNTER — OFFICE VISIT (OUTPATIENT)
Dept: FAMILY MEDICINE CLINIC | Age: 83
End: 2018-01-24

## 2018-01-24 VITALS
TEMPERATURE: 97.9 F | BODY MASS INDEX: 22.16 KG/M2 | RESPIRATION RATE: 20 BRPM | OXYGEN SATURATION: 96 % | WEIGHT: 120.4 LBS | SYSTOLIC BLOOD PRESSURE: 146 MMHG | DIASTOLIC BLOOD PRESSURE: 86 MMHG | HEART RATE: 62 BPM | HEIGHT: 62 IN

## 2018-01-24 DIAGNOSIS — E78.00 PURE HYPERCHOLESTEROLEMIA: ICD-10-CM

## 2018-01-24 DIAGNOSIS — I48.91 ATRIAL FIBRILLATION, UNSPECIFIED TYPE (HCC): ICD-10-CM

## 2018-01-24 DIAGNOSIS — E55.9 HYPOVITAMINOSIS D: ICD-10-CM

## 2018-01-24 DIAGNOSIS — I10 ESSENTIAL HYPERTENSION: Primary | ICD-10-CM

## 2018-01-24 RX ORDER — LOSARTAN POTASSIUM 100 MG/1
TABLET ORAL
Qty: 90 TAB | Refills: 2 | Status: SHIPPED | OUTPATIENT
Start: 2018-01-24 | End: 2019-01-08 | Stop reason: SDUPTHER

## 2018-01-24 NOTE — PROGRESS NOTES
Anaya Spears is a 80 y.o. female is here for a general follow up. 1. Have you been to the ER, urgent care clinic since your last visit? Hospitalized since your last visit? No    2. Have you seen or consulted any other health care providers outside of the [address] [place] [address] since your last visit? Include any pap smears or colon screening. Dr Cole Beverage Maintenance Due   Topic Date Due    Influenza Age 5 to Adult  08/01/2017     Already got flu shot.

## 2018-01-24 NOTE — PATIENT INSTRUCTIONS
High Blood Pressure: Care Instructions  Your Care Instructions    If your blood pressure is usually above 140/90, you have high blood pressure, or hypertension. That means the top number is 140 or higher or the bottom number is 90 or higher, or both. Despite what a lot of people think, high blood pressure usually doesn't cause headaches or make you feel dizzy or lightheaded. It usually has no symptoms. But it does increase your risk for heart attack, stroke, and kidney or eye damage. The higher your blood pressure, the more your risk increases. Your doctor will give you a goal for your blood pressure. Your goal will be based on your health and your age. An example of a goal is to keep your blood pressure below 140/90. Lifestyle changes, such as eating healthy and being active, are always important to help lower blood pressure. You might also take medicine to reach your blood pressure goal.  Follow-up care is a key part of your treatment and safety. Be sure to make and go to all appointments, and call your doctor if you are having problems. It's also a good idea to know your test results and keep a list of the medicines you take. How can you care for yourself at home? Medical treatment  · If you stop taking your medicine, your blood pressure will go back up. You may take one or more types of medicine to lower your blood pressure. Be safe with medicines. Take your medicine exactly as prescribed. Call your doctor if you think you are having a problem with your medicine. · Talk to your doctor before you start taking aspirin every day. Aspirin can help certain people lower their risk of a heart attack or stroke. But taking aspirin isn't right for everyone, because it can cause serious bleeding. · See your doctor regularly. You may need to see the doctor more often at first or until your blood pressure comes down.   · If you are taking blood pressure medicine, talk to your doctor before you take decongestants or anti-inflammatory medicine, such as ibuprofen. Some of these medicines can raise blood pressure. · Learn how to check your blood pressure at home. Lifestyle changes  · Stay at a healthy weight. This is especially important if you put on weight around the waist. Losing even 10 pounds can help you lower your blood pressure. · If your doctor recommends it, get more exercise. Walking is a good choice. Bit by bit, increase the amount you walk every day. Try for at least 30 minutes on most days of the week. You also may want to swim, bike, or do other activities. · Avoid or limit alcohol. Talk to your doctor about whether you can drink any alcohol. · Try to limit how much sodium you eat to less than 2,300 milligrams (mg) a day. Your doctor may ask you to try to eat less than 1,500 mg a day. · Eat plenty of fruits (such as bananas and oranges), vegetables, legumes, whole grains, and low-fat dairy products. · Lower the amount of saturated fat in your diet. Saturated fat is found in animal products such as milk, cheese, and meat. Limiting these foods may help you lose weight and also lower your risk for heart disease. · Do not smoke. Smoking increases your risk for heart attack and stroke. If you need help quitting, talk to your doctor about stop-smoking programs and medicines. These can increase your chances of quitting for good. When should you call for help? Call 911 anytime you think you may need emergency care. This may mean having symptoms that suggest that your blood pressure is causing a serious heart or blood vessel problem. Your blood pressure may be over 180/110. ? For example, call 911 if:  ? · You have symptoms of a heart attack. These may include:  ¨ Chest pain or pressure, or a strange feeling in the chest.  ¨ Sweating. ¨ Shortness of breath. ¨ Nausea or vomiting.   ¨ Pain, pressure, or a strange feeling in the back, neck, jaw, or upper belly or in one or both shoulders or arms.  ¨ Lightheadedness or sudden weakness. ¨ A fast or irregular heartbeat. ? · You have symptoms of a stroke. These may include:  ¨ Sudden numbness, tingling, weakness, or loss of movement in your face, arm, or leg, especially on only one side of your body. ¨ Sudden vision changes. ¨ Sudden trouble speaking. ¨ Sudden confusion or trouble understanding simple statements. ¨ Sudden problems with walking or balance. ¨ A sudden, severe headache that is different from past headaches. ? · You have severe back or belly pain. ?Do not wait until your blood pressure comes down on its own. Get help right away. ?Call your doctor now or seek immediate care if:  ? · Your blood pressure is much higher than normal (such as 180/110 or higher), but you don't have symptoms. ? · You think high blood pressure is causing symptoms, such as:  ¨ Severe headache. ¨ Blurry vision. ? Watch closely for changes in your health, and be sure to contact your doctor if:  ? · Your blood pressure measures 140/90 or higher at least 2 times. That means the top number is 140 or higher or the bottom number is 90 or higher, or both. ? · You think you may be having side effects from your blood pressure medicine. ? · Your blood pressure is usually normal, but it goes above normal at least 2 times. Where can you learn more? Go to http://deanna-antionette.info/. Enter L593 in the search box to learn more about \"High Blood Pressure: Care Instructions. \"  Current as of: September 21, 2016  Content Version: 11.4  © 8333-7915 [employer]. Care instructions adapted under license by [employer] (which disclaims liability or warranty for this information). If you have questions about a medical condition or this instruction, always ask your healthcare professional. [place] [address] any warranty or liability for your use of this information.

## 2018-01-24 NOTE — PROGRESS NOTES
Assessment/Plan:    *Diagnoses and all orders for this visit:    1. Essential hypertension  -     losartan (COZAAR) 100 mg tablet; TAKE ONE TABLET BY MOUTH ONCE DAILY    2. Pure hypercholesterolemia  -     CBC WITH AUTOMATED DIFF; Future  -     HEPATIC FUNCTION PANEL; Future  -     LIPID PANEL; Future  -     METABOLIC PANEL, BASIC; Future  -     TSH 3RD GENERATION; Future  -     T4, FREE; Future  -     URINALYSIS W/ RFLX MICROSCOPIC; Future    3. Atrial fibrillation, unspecified type ([address]    4. Hypovitaminosis D  -     VITAMIN D, 25 HYDROXY; Future    Physical in July.  BW. The plan was discussed with the patient. The patient verbalized understanding and is in agreement with the plan. All medication potential side effects were discussed with the patient.    -------------------------------------------------------------------------------------------------------------------        Jen Cage is a 80 y.o. female and presents with Blood Pressure Check         Subjective:  Pt here for f/u. HTN: well controlled. Home readings have been very well controlled. They typically run from 120's to 130's / 50's. Had her flu vaccine. A Fib: managed by cardiology. HLD: stable. ROS:  Constitutional: No recent weight change. No weakness/fatigue. No f/c. Skin: No rashes, change in nails/hair, itching   HENT: No HA, dizziness. No hearing loss/tinnitus. No nasal congestion/discharge. Eyes: No change in vision, double/blurred vision or eye pain/redness. Cardiovascular: No CP/palpitations. No STEWART/orthopnea/PND. Respiratory: No cough/sputum, dyspnea, wheezing. Gastointestinal: No dysphagia, reflux. No n/v. No constipation/diarrhea. No melena/rectal bleeding. Genitourinary: No dysuria, urinary hesitancy, nocturia, hematuria. No incontinence. Musculoskeletal: No joint pain/stiffness. No muscle pain/tenderness. Endo: No heat/cold intolerance, no polyuria/polydypsia. Heme: No h/o anemia. No easy bleeding/bruising. Allergy/Immunology: No seasonal rhinitis. Denies frequent colds, sinus/ear infections. Neurological: No seizures/numbness/weakness. No paresthesias. Psychiatric:  No depression, anxiety. The problem list was updated as a part of today's visit. 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 SH:  Social History   Substance Use Topics    Smoking status: Never Smoker    Smokeless tobacco: Never Used    Alcohol use No       Medications/Allergies:  Current Outpatient Prescriptions on File Prior to Visit   Medication Sig Dispense Refill    sertraline (ZOLOFT) 25 mg tablet TAKE ONE TABLET BY MOUTH ONCE DAILY 90 Tab 1    sotalol (BETAPACE) 80 mg tablet Take  by mouth daily.  Cholecalciferol, Vitamin D3, 1,000 unit cap Take  by mouth daily.  aspirin delayed-release 81 mg tablet Take  by mouth daily.  omega-3 fatty acids-vitamin e (FISH OIL) 1,000 mg Cap Take 1 Cap by mouth.  calcium-cholecalciferol, D3, (CALCIUM 600 + D) tablet Take 1 Tab by mouth daily.  latanoprost (XALATAN) 0.005 % ophthalmic solution Administer 1 Drop to both eyes nightly.  timolol (TIMOPTIC) 0.25 % ophthalmic solution Administer 1 Drop to both eyes two (2) times a day. No current facility-administered medications on file prior to visit.          No Known Allergies      Health Maintenance:   Health Maintenance   Topic Date Due    Influenza Age 5 to Adult  08/01/2017    MEDICARE YEARLY EXAM  07/20/2018    GLAUCOMA SCREENING Q2Y  12/12/2019    DTaP/Tdap/Td series (2 - Td) 07/19/2027    OSTEOPOROSIS SCREENING (DEXA)  Completed    ZOSTER VACCINE AGE 60>  Completed    Pneumococcal 65+ Low/Medium Risk  Completed       Objective:  Visit Vitals    /86 (BP 1 Location: Left arm, BP Patient Position: Sitting)    Pulse 62    Temp 97.9 °F (36.6 °C) (Oral)    Resp 20    Ht 5' 2\" (1.575 m)    Wt 120 lb 6.4 oz (54.6 kg)    SpO2 96%    BMI 22.02 kg/m2          Nurses notes and VS reviewed. Physical Examination: General appearance - alert, well appearing, and in no distress  Chest - clear to auscultation, no wheezes, rales or rhonchi, symmetric air entry  Heart - normal rate, regular rhythm, normal S1, S2, no murmurs, rubs, clicks or gallops        Labwork and Ancillary Studies:    CBC w/Diff  Lab Results   Component Value Date/Time    WBC 12.8 07/10/2017 09:12 AM    HGB 13.4 07/10/2017 09:12 AM    PLATELET 738 01/47/7589 09:12 AM         Basic Metabolic Profile  Lab Results   Component Value Date/Time    Sodium 140 07/10/2017 09:12 AM    Potassium 4.9 07/10/2017 09:12 AM    Chloride 105 07/10/2017 09:12 AM    CO2 27 07/10/2017 09:12 AM    Anion gap 8 07/10/2017 09:12 AM    Glucose 123 07/10/2017 09:12 AM    BUN 23 07/10/2017 09:12 AM    Creatinine 1.48 07/10/2017 09:12 AM    BUN/Creatinine ratio 16 07/10/2017 09:12 AM    GFR est AA 40 07/10/2017 09:12 AM    GFR est non-AA 33 07/10/2017 09:12 AM    Calcium 9.0 07/10/2017 09:12 AM         LFT  Lab Results   Component Value Date/Time    ALT (SGPT) 19 07/10/2017 09:12 AM    AST (SGOT) 16 07/10/2017 09:12 AM    Alk.  phosphatase 62 07/10/2017 09:12 AM    Bilirubin, direct <0.1 07/10/2017 09:12 AM    Bilirubin, total 0.2 07/10/2017 09:12 AM         Cholesterol  Lab Results   Component Value Date/Time    Cholesterol, total 201 07/10/2017 09:12 AM    HDL Cholesterol 47 07/10/2017 09:12 AM    LDL, calculated 123.6 07/10/2017 09:12 AM    Triglyceride 152 07/10/2017 09:12 AM    CHOL/HDL Ratio 4.3 07/10/2017 09:12 AM

## 2018-01-24 NOTE — MR AVS SNAPSHOT
[address] [place] [address]  [address] [facility] [address] 
[phone] Patient: Nenita Ayers MRN: [id number] JMT:[id number] Visit Information Date & Time Provider Department Dept. Phone Encounter #  
 1/24/2018  2:30 PM Rod Cali, 3 Kwon Peterson [phone] [id number] Upcoming Health Maintenance Date Due Influenza Age 5 to Adult 8/1/2017 MEDICARE YEARLY EXAM 7/20/2018 GLAUCOMA SCREENING Q2Y 12/12/2019 DTaP/Tdap/Td series (2 - Td) 7/19/2027 Allergies as of 1/24/2018  Review Complete On: 1/24/2018 By: Rod Cali MD  
 No Known Allergies Current Immunizations  Reviewed on 1/24/2018 Name Date Influenza High Dose Vaccine PF 9/13/2017, 10/12/2016 Influenza Vaccine 9/23/2015, 12/16/2014, 10/16/2013 Pneumococcal Conjugate (PCV-13) 1/18/2017 Pneumococcal Vaccine (Unspecified Type) 4/16/2009 Zoster Vaccine, Live 9/10/2014 Reviewed by Rod Cali MD on 1/24/2018 at  2:27 PM  
 Reviewed by Rod Cali MD on 1/24/2018 at  2:29 PM  
You Were Diagnosed With   
  
 Codes Comments Essential hypertension    -  Primary ICD-10-CM: I10 
ICD-9-CM: 401.9 Pure hypercholesterolemia     ICD-10-CM: E78.00 ICD-9-CM: 272.0 Atrial fibrillation, unspecified type ([facility]     ICD-10-CM: I48.91 
ICD-9-CM: 427.31 Hypovitaminosis D     ICD-10-CM: E55.9 ICD-9-CM: 268.9 Vitals BP Pulse Temp Resp Height(growth percentile) Weight(growth percentile) 146/86 (BP 1 Location: Left arm, BP Patient Position: Sitting) 62 97.9 °F (36.6 °C) (Oral) 20 5' 2\" (1.575 m) 120 lb 6.4 oz (54.6 kg) SpO2 BMI OB Status Smoking Status 96% 22.02 kg/m2 Postmenopausal Never Smoker BMI and BSA Data Body Mass Index Body Surface Area 22.02 kg/m 2 1.55 m 2 Preferred Pharmacy Pharmacy Name Phone [phone] , [facility] [phone] Your Updated Medication List  
  
   
This list is accurate as of: 1/24/18  2:39 PM.  Always use your most recent med list.  
  
  
  
  
 aspirin delayed-release 81 mg tablet Take  by mouth daily. Calcium 600 + D tablet Generic drug:  calcium-cholecalciferol (D3) Take 1 Tab by mouth daily. cholecalciferol 1,000 unit Cap Commonly known as:  VITAMIN D3 Take  by mouth daily. FISH OIL 1,000 mg Cap Generic drug:  omega-3 fatty acids-vitamin e Take 1 Cap by mouth.  
  
 losartan 100 mg tablet Commonly known as:  COZAAR  
TAKE ONE TABLET BY MOUTH ONCE DAILY  
  
 sertraline 25 mg tablet Commonly known as:  ZOLOFT  
TAKE ONE TABLET BY MOUTH ONCE DAILY  
  
 sotalol 80 mg tablet Commonly known as:  Mike Messi Take  by mouth daily. timolol 0.25 % ophthalmic solution Commonly known as:  TIMOPTIC Administer 1 Drop to both eyes two (2) times a day. XALATAN 0.005 % ophthalmic solution Generic drug:  latanoprost  
Administer 1 Drop to both eyes nightly. Prescriptions Sent to Pharmacy Refills  
 losartan (COZAAR) 100 mg tablet 2 Sig: TAKE ONE TABLET BY MOUTH ONCE DAILY Class: Normal  
 Pharmacy: [address] [place] [address] Ph #: [phone] To-Do List   
 01/24/2018 Lab:  CBC WITH AUTOMATED DIFF   
  
 01/24/2018 Lab:  HEPATIC FUNCTION PANEL   
  
 01/24/2018 Lab:  LIPID PANEL   
  
 01/24/2018 Lab:  METABOLIC PANEL, BASIC   
  
 01/24/2018 Lab:  T4, FREE   
  
 01/24/2018 Lab:  TSH 3RD GENERATION   
  
 01/24/2018 Lab:  URINALYSIS W/ RFLX MICROSCOPIC   
  
 01/24/2018 Lab:  VITAMIN D, 25 HYDROXY Patient Instructions High Blood Pressure: Care Instructions Your Care Instructions If your blood pressure is usually above 140/90, you have high blood pressure, or hypertension. That means the top number is 140 or higher or the bottom number is 90 or higher, or both. Despite what a lot of people think, high blood pressure usually doesn't cause headaches or make you feel dizzy or lightheaded. It usually has no symptoms. But it does increase your risk for heart attack, stroke, and kidney or eye damage. The higher your blood pressure, the more your risk increases. Your doctor will give you a goal for your blood pressure. Your goal will be based on your health and your age. An example of a goal is to keep your blood pressure below 140/90. Lifestyle changes, such as eating healthy and being active, are always important to help lower blood pressure. You might also take medicine to reach your blood pressure goal. 
Follow-up care is a key part of your treatment and safety. Be sure to make and go to all appointments, and call your doctor if you are having problems. It's also a good idea to know your test results and keep a list of the medicines you take. How can you care for yourself at home? Medical treatment · If you stop taking your medicine, your blood pressure will go back up. You may take one or more types of medicine to lower your blood pressure. Be safe with medicines. Take your medicine exactly as prescribed. Call your doctor if you think you are having a problem with your medicine. · Talk to your doctor before you start taking aspirin every day. Aspirin can help certain people lower their risk of a heart attack or stroke. But taking aspirin isn't right for everyone, because it can cause serious bleeding. · See your doctor regularly. You may need to see the doctor more often at first or until your blood pressure comes down. · If you are taking blood pressure medicine, talk to your doctor before you take decongestants or anti-inflammatory medicine, such as ibuprofen. Some of these medicines can raise blood pressure. · Learn how to check your blood pressure at home. Lifestyle changes · Stay at a healthy weight. This is especially important if you put on weight around the waist. Losing even 10 pounds can help you lower your blood pressure. · If your doctor recommends it, get more exercise. Walking is a good choice. Bit by bit, increase the amount you walk every day. Try for at least 30 minutes on most days of the week. You also may want to swim, bike, or do other activities. · Avoid or limit alcohol. Talk to your doctor about whether you can drink any alcohol. · Try to limit how much sodium you eat to less than 2,300 milligrams (mg) a day. Your doctor may ask you to try to eat less than 1,500 mg a day. · Eat plenty of fruits (such as bananas and oranges), vegetables, legumes, whole grains, and low-fat dairy products. · Lower the amount of saturated fat in your diet. Saturated fat is found in animal products such as milk, cheese, and meat. Limiting these foods may help you lose weight and also lower your risk for heart disease. · Do not smoke. Smoking increases your risk for heart attack and stroke. If you need help quitting, talk to your doctor about stop-smoking programs and medicines. These can increase your chances of quitting for good. When should you call for help? Call 911 anytime you think you may need emergency care. This may mean having symptoms that suggest that your blood pressure is causing a serious heart or blood vessel problem. Your blood pressure may be over 180/110. ? For example, call 911 if: 
? · You have symptoms of a heart attack. These may include: ¨ Chest pain or pressure, or a strange feeling in the chest. 
¨ Sweating. ¨ Shortness of breath. ¨ Nausea or vomiting. ¨ Pain, pressure, or a strange feeling in the back, neck, jaw, or upper belly or in one or both shoulders or arms. ¨ Lightheadedness or sudden weakness. ¨ A fast or irregular heartbeat. ? · You have symptoms of a stroke. These may include: 
¨ Sudden numbness, tingling, weakness, or loss of movement in your face, arm, or leg, especially on only one side of your body. ¨ Sudden vision changes. ¨ Sudden trouble speaking. ¨ Sudden confusion or trouble understanding simple statements. ¨ Sudden problems with walking or balance. ¨ A sudden, severe headache that is different from past headaches. ? · You have severe back or belly pain. ?Do not wait until your blood pressure comes down on its own. Get help right away. ?Call your doctor now or seek immediate care if: 
? · Your blood pressure is much higher than normal (such as 180/110 or higher), but you don't have symptoms. ? · You think high blood pressure is causing symptoms, such as: ¨ Severe headache. ¨ Blurry vision. ? Watch closely for changes in your health, and be sure to contact your doctor if: 
? · Your blood pressure measures 140/90 or higher at least 2 times. That means the top number is 140 or higher or the bottom number is 90 or higher, or both. ? · You think you may be having side effects from your blood pressure medicine. ? · Your blood pressure is usually normal, but it goes above normal at least 2 times. Where can you learn more? Go to http://deanna-antionette.info/. Enter V444 in the search box to learn more about \"High Blood Pressure: Care Instructions. \" Current as of: September 21, 2016 Content Version: 11.4 © 1534-4953 [employer]. Care instructions adapted under license by [employer] (which disclaims liability or warranty for this information). If you have questions about a medical condition or this instruction, always ask your healthcare professional. [place] [address] any warranty or liability for your use of this information. Introducing [facility]!    
 [address] introduces [employer] patient portal. Now you can access parts of your medical record, email your doctor's office, and request medication refills online. 1. In your internet browser, go to [email or web address] 2. Click on the First Time User? Click Here link in the Sign In box. You will see the New Member Sign Up page. 3. Enter your [employer] Access Code exactly as it appears below. You will not need to use this code after youve completed the sign-up process. If you do not sign up before the expiration date, you must request a new code. · [employer] Access Code: [email or web address] Expires: 4/24/2018  2:39 PM 
 
4. Enter the last four digits of your Social Security Number (xxxx) and Date of Birth (mm/dd/yyyy) as indicated and click Submit. You will be taken to the next sign-up page. 5. Create a Lectorati ID. This will be your [employer]ati login ID and cannot be changed, so think of one that is secure and easy to remember. 6. Create a [employer]ati password. You can change your password at any time. 7. Enter your Password Reset Question and Answer. This can be used at a later time if you forget your password. 8. Enter your e-mail address. You will receive e-mail notification when new information is available in [address] 9. Click Sign Up. You can now view and download portions of your medical record. 10. Click the Download Summary menu link to download a portable copy of your medical information. If you have questions, please visit the Frequently Asked Questions section of the [employer] website. Remember, [employer] is NOT to be used for urgent needs. For medical emergencies, dial 911. Now available from your iPhone and Android! Please provide this summary of care documentation to your next provider. Your primary care clinician is listed as Alona 51. If you have any questions after today's visit, please call [phone].

## 2018-04-26 RX ORDER — SERTRALINE HYDROCHLORIDE 25 MG/1
TABLET, FILM COATED ORAL
Qty: 90 TAB | Refills: 1 | Status: SHIPPED | OUTPATIENT
Start: 2018-04-26 | End: 2018-10-29 | Stop reason: SDUPTHER

## 2018-06-11 ENCOUNTER — PATIENT OUTREACH (OUTPATIENT)
Dept: FAMILY MEDICINE CLINIC | Age: 83
End: 2018-06-11

## 2018-06-11 RX ORDER — METHOCARBAMOL 500 MG/1
TABLET, FILM COATED ORAL
COMMUNITY
Start: 2018-06-04 | End: 2019-07-11

## 2018-06-11 RX ORDER — IBUPROFEN 600 MG/1
TABLET ORAL
COMMUNITY
Start: 2018-06-04 | End: 2019-01-08

## 2018-06-11 NOTE — PROGRESS NOTES
Hospital Discharge Follow-Up      Date/Time:  2018 1:23 PM    Patient was seen in ED for back pain. Patient went to [facility] earlier in the week prior to going to ED. Patient requested a steroid injection. [facility] nor ED gave her the steroid injection. Patient asked that I call her daughter Micheline Macias at [phone] and speak with her. I spoke with Mrs. Nik Benjamin and she will schedule an appointment with  [facility] [place] to see about getting a steroid injection. They were advised to try a tens unit which they have brought. Mrs. Nik Benjamin states it seems to be helping a little. They decline an appointment with  at this time. They will contact our office if assistance is needed. Daughter will have her mother sign a medical releases form at next visit. Top Challenges reviewed with the provider   back pain  Will call office if referral is needed         Method of communication with provider :chart routing    Inpatient RRAT score: Low Risk            0       Total Score            Criteria that do not apply:    Has Seen PCP in Last 6 Months (Yes=3, No=0)    . Living with Significant Other. Assisted Living. LTAC. SNF. or   Rehab    Patient Length of Stay (>5 days = 3)    IP Visits Last 12 Months (1-3=4, 4=9, >4=11)    Pt. Coverage (Medicare=5 , Medicaid, or Self-Pay=4)    Charlson Comorbidity Score (Age + Comorbid Conditions)        Was this a readmission? no  Patient stated reason for the readmission: n/a    Panel Manager contacted the patient by telephone to perform post hospital discharge assessment. Verified name and  with patient as identifiers. Provided introduction to self, and explanation of the Panel Manager role. Reviewed discharge instructions and red flags with patient who verbalized understanding. Patient given an opportunity to ask questions and does not have any further questions or concerns at this time.  The patient agrees to contact the PCP office for questions related to their healthcare. Panel manager provided contact information for future reference. Disease Specific:   N/A    Summary of patient's top problems:  1. Back pain  2. Requesting steroid injection  3. Home Health orders at discharge: no  [address]: n/a  Date of initial visit: n/a    Durable Medical Equipment ordered/company: no  Durable Medical Equipment received: none    Barriers to care? none    Advance Care Planning:   Does patient have an Advance Directive:  not on file     Medication(s):     New Medications at Discharge: Prednisone  Changed Medications at Discharge: none  Discontinued Medications at Discharge: none    Medication reconciliation was performed with patient, who verbalizes understanding of administration of home medications. There were no barriers to obtaining medications identified at this time. Referral to Pharm D needed: no     Current Outpatient Prescriptions   Medication Sig    predniSONE (DELTASONE) 20 mg tablet Take 2 Tabs by mouth daily for 5 days. With Breakfast    sertraline (ZOLOFT) 25 mg tablet TAKE ONE TABLET BY MOUTH ONCE DAILY    losartan (COZAAR) 100 mg tablet TAKE ONE TABLET BY MOUTH ONCE DAILY    sotalol (BETAPACE) 80 mg tablet Take  by mouth daily.  Cholecalciferol, Vitamin D3, 1,000 unit cap Take  by mouth daily.  omega-3 fatty acids-vitamin e (FISH OIL) 1,000 mg Cap Take 1 Cap by mouth.  calcium-cholecalciferol, D3, (CALCIUM 600 + D) tablet Take 1 Tab by mouth daily.  latanoprost (XALATAN) 0.005 % ophthalmic solution Administer 1 Drop to both eyes nightly.  timolol (TIMOPTIC) 0.25 % ophthalmic solution Administer 1 Drop to both eyes two (2) times a day.  aspirin delayed-release 81 mg tablet Take  by mouth daily. No current facility-administered medications for this visit. There are no discontinued medications.     PCP/Specialist follow up: Future Appointments  Date Time Provider Department Center   7/11/2018 11:00 AM Hilaria Hamman, MD [facility]          Goals      Returns to baseline activity level.

## 2018-06-18 ENCOUNTER — PATIENT OUTREACH (OUTPATIENT)
Dept: FAMILY MEDICINE CLINIC | Age: 83
End: 2018-06-18

## 2018-06-18 NOTE — PROGRESS NOTES
Hospital Discharge Follow-Up      Date/Time:  6/18/2018 1:47 PM    Patient Outreach made to patient./ she states he is doing fine. She has not seen orthopedics. She states she is going to wait because she does not want to do therapy. It has help in the past. She will follow up with Dr. Chante Lorenz in July. Top Challenges reviewed with the provider   none                 Current Outpatient Prescriptions   Medication Sig    ibuprofen (MOTRIN) 600 mg tablet     methocarbamol (ROBAXIN) 500 mg tablet     sertraline (ZOLOFT) 25 mg tablet TAKE ONE TABLET BY MOUTH ONCE DAILY    losartan (COZAAR) 100 mg tablet TAKE ONE TABLET BY MOUTH ONCE DAILY    sotalol (BETAPACE) 80 mg tablet Take  by mouth daily.  Cholecalciferol, Vitamin D3, 1,000 unit cap Take  by mouth daily.  aspirin delayed-release 81 mg tablet Take  by mouth daily.  omega-3 fatty acids-vitamin e (FISH OIL) 1,000 mg Cap Take 1 Cap by mouth.  calcium-cholecalciferol, D3, (CALCIUM 600 + D) tablet Take 1 Tab by mouth daily.  latanoprost (XALATAN) 0.005 % ophthalmic solution Administer 1 Drop to both eyes nightly.  timolol (TIMOPTIC) 0.25 % ophthalmic solution Administer 1 Drop to both eyes two (2) times a day. No current facility-administered medications for this visit. There are no discontinued medications. PCP/Specialist follow up: Future Appointments  Date Time Provider Bhargavi Stauffer   7/11/2018 11:00 AM MD BONI Marsh [facility]          Goals      Returns to baseline activity level.

## 2018-07-02 ENCOUNTER — HOSPITAL ENCOUNTER (OUTPATIENT)
Dept: LAB | Age: 83
Discharge: HOME | End: 2018-07-02
Payer: MEDICARE

## 2018-07-02 DIAGNOSIS — E78.00 PURE HYPERCHOLESTEROLEMIA: ICD-10-CM

## 2018-07-02 LAB
ALBUMIN SERPL-MCNC: 3.5 G/DL (ref 3.4–5)
ALBUMIN/GLOB SERPL: 1.2 {RATIO} (ref 0.8–1.7)
ALP SERPL-CCNC: 65 U/L (ref 45–117)
ALT SERPL-CCNC: 18 U/L (ref 13–56)
ANION GAP SERPL CALC-SCNC: 7 MMOL/L (ref 3–18)
APPEARANCE UR: CLEAR
AST SERPL-CCNC: 18 U/L (ref 15–37)
BACTERIA URNS QL MICRO: NEGATIVE /HPF
BASOPHILS # BLD: 0 K/UL (ref 0–0.06)
BASOPHILS NFR BLD: 0 % (ref 0–2)
BILIRUB DIRECT SERPL-MCNC: 0.1 MG/DL (ref 0–0.2)
BILIRUB SERPL-MCNC: 0.6 MG/DL (ref 0.2–1)
BILIRUB UR QL: NEGATIVE
BUN SERPL-MCNC: 23 MG/DL (ref 7–18)
BUN/CREAT SERPL: 17 (ref 12–20)
CALCIUM SERPL-MCNC: 8.6 MG/DL (ref 8.5–10.1)
CHLORIDE SERPL-SCNC: 105 MMOL/L (ref 100–108)
CHOLEST SERPL-MCNC: 210 MG/DL
CO2 SERPL-SCNC: 27 MMOL/L (ref 21–32)
COLOR UR: YELLOW
CREAT SERPL-MCNC: 1.35 MG/DL (ref 0.6–1.3)
DIFFERENTIAL METHOD BLD: ABNORMAL
EOSINOPHIL # BLD: 0.1 K/UL (ref 0–0.4)
EOSINOPHIL NFR BLD: 1 % (ref 0–5)
ERYTHROCYTE [DISTWIDTH] IN BLOOD BY AUTOMATED COUNT: 15 % (ref 11.6–14.5)
GLOBULIN SER CALC-MCNC: 3 G/DL (ref 2–4)
GLUCOSE SERPL-MCNC: 124 MG/DL (ref 74–99)
GLUCOSE UR STRIP.AUTO-MCNC: NEGATIVE MG/DL
HCT VFR BLD AUTO: 42.9 % (ref 35–45)
HDLC SERPL-MCNC: 53 MG/DL (ref 40–60)
HDLC SERPL: 4 {RATIO} (ref 0–5)
HGB BLD-MCNC: 13.6 G/DL (ref 12–16)
HGB UR QL STRIP: NEGATIVE
KETONES UR QL STRIP.AUTO: NEGATIVE MG/DL
LDLC SERPL CALC-MCNC: 125.2 MG/DL (ref 0–100)
LEUKOCYTE ESTERASE UR QL STRIP.AUTO: ABNORMAL
LIPID PROFILE,FLP: ABNORMAL
LYMPHOCYTES # BLD: 2.1 K/UL (ref 0.9–3.6)
LYMPHOCYTES NFR BLD: 19 % (ref 21–52)
MCH RBC QN AUTO: 29.6 PG (ref 24–34)
MCHC RBC AUTO-ENTMCNC: 31.7 G/DL (ref 31–37)
MCV RBC AUTO: 93.5 FL (ref 74–97)
MONOCYTES # BLD: 0.8 K/UL (ref 0.05–1.2)
MONOCYTES NFR BLD: 7 % (ref 3–10)
MUCOUS THREADS URNS QL MICRO: ABNORMAL /LPF
NEUTS SEG # BLD: 7.9 K/UL (ref 1.8–8)
NEUTS SEG NFR BLD: 73 % (ref 40–73)
NITRITE UR QL STRIP.AUTO: NEGATIVE
PH UR STRIP: 6.5 [PH] (ref 5–8)
PLATELET # BLD AUTO: 250 K/UL (ref 135–420)
PMV BLD AUTO: 10.9 FL (ref 9.2–11.8)
POTASSIUM SERPL-SCNC: 4.5 MMOL/L (ref 3.5–5.5)
PROT SERPL-MCNC: 6.5 G/DL (ref 6.4–8.2)
PROT UR STRIP-MCNC: NEGATIVE MG/DL
RBC # BLD AUTO: 4.59 M/UL (ref 4.2–5.3)
RBC #/AREA URNS HPF: 0 /HPF (ref 0–5)
SODIUM SERPL-SCNC: 139 MMOL/L (ref 136–145)
SP GR UR REFRACTOMETRY: 1.01 (ref 1–1.03)
T4 FREE SERPL-MCNC: 1 NG/DL (ref 0.7–1.5)
TRIGL SERPL-MCNC: 159 MG/DL (ref ?–150)
TSH SERPL DL<=0.05 MIU/L-ACNC: 2.08 UIU/ML (ref 0.36–3.74)
UROBILINOGEN UR QL STRIP.AUTO: 0.2 EU/DL (ref 0.2–1)
VLDLC SERPL CALC-MCNC: 31.8 MG/DL
WBC # BLD AUTO: 10.9 K/UL (ref 4.6–13.2)
WBC URNS QL MICRO: ABNORMAL /HPF (ref 0–4)

## 2018-07-02 PROCEDURE — 80061 LIPID PANEL: CPT | Performed by: INTERNAL MEDICINE

## 2018-07-02 PROCEDURE — 80076 HEPATIC FUNCTION PANEL: CPT | Performed by: INTERNAL MEDICINE

## 2018-07-02 PROCEDURE — 80048 BASIC METABOLIC PNL TOTAL CA: CPT | Performed by: INTERNAL MEDICINE

## 2018-07-02 PROCEDURE — 84443 ASSAY THYROID STIM HORMONE: CPT | Performed by: INTERNAL MEDICINE

## 2018-07-02 PROCEDURE — 84439 ASSAY OF FREE THYROXINE: CPT | Performed by: INTERNAL MEDICINE

## 2018-07-02 PROCEDURE — 85025 COMPLETE CBC W/AUTO DIFF WBC: CPT | Performed by: INTERNAL MEDICINE

## 2018-07-02 PROCEDURE — 36415 COLL VENOUS BLD VENIPUNCTURE: CPT | Performed by: INTERNAL MEDICINE

## 2018-07-02 PROCEDURE — 81001 URINALYSIS AUTO W/SCOPE: CPT | Performed by: INTERNAL MEDICINE

## 2018-07-11 ENCOUNTER — OFFICE VISIT (OUTPATIENT)
Dept: FAMILY MEDICINE CLINIC | Age: 83
End: 2018-07-11

## 2018-07-11 VITALS
HEIGHT: 62 IN | HEART RATE: 60 BPM | RESPIRATION RATE: 17 BRPM | SYSTOLIC BLOOD PRESSURE: 132 MMHG | OXYGEN SATURATION: 95 % | BODY MASS INDEX: 21.42 KG/M2 | TEMPERATURE: 97.5 F | WEIGHT: 116.4 LBS | DIASTOLIC BLOOD PRESSURE: 70 MMHG

## 2018-07-11 DIAGNOSIS — I10 ESSENTIAL HYPERTENSION: Primary | ICD-10-CM

## 2018-07-11 DIAGNOSIS — Z00.00 MEDICARE ANNUAL WELLNESS VISIT, SUBSEQUENT: ICD-10-CM

## 2018-07-11 DIAGNOSIS — E78.00 PURE HYPERCHOLESTEROLEMIA: ICD-10-CM

## 2018-07-11 NOTE — PROGRESS NOTES
Delores Cam is a 80 y.o. female (: 1926) presenting to address:    Chief Complaint   Patient presents with   [facility] Follow Up       Vitals:    18 1105   BP: 132/70   Pulse: 60   Resp: 17   Temp: 97.5 °F (36.4 °C)   TempSrc: Oral   SpO2: 95%   Weight: 116 lb 6.4 oz (52.8 kg)   Height: 5' 2\" (1.575 m)   PainSc:   0 - No pain       Hearing/Vision:   No exam data present    Learning Assessment:     Learning Assessment 2016   PRIMARY LEARNER Patient   HIGHEST LEVEL OF EDUCATION - PRIMARY LEARNER  GRADUATED HIGH SCHOOL OR GED   BARRIERS PRIMARY LEARNER NONE   CO-LEARNER CAREGIVER No   PRIMARY LANGUAGE ENGLISH   LEARNER PREFERENCE PRIMARY DEMONSTRATION   ANSWERED BY self   RELATIONSHIP SELF     Depression Screening:     PHQ over the last two weeks 2018   Little interest or pleasure in doing things Not at all   Feeling down, depressed or hopeless Not at all   Total Score PHQ 2 0     Fall Risk Assessment:     Fall Risk Assessment, last 12 mths 2018   Able to walk? Yes   Fall in past 12 months? No   Fall with injury? -   Number of falls in past 12 months -     Abuse Screening:     Abuse Screening Questionnaire 2015   Do you ever feel afraid of your partner? N   Are you in a relationship with someone who physically or mentally threatens you? N   Is it safe for you to go home? Y     Coordination of Care Questionaire:   1. Have you been to the ER, urgent care clinic since your last visit? Hospitalized since your last visit? YES Patient [facility] in  for pain    2. Have you seen or consulted any other health care providers outside of the [facility] since your last visit? Include any pap smears or colon screening. NO    Advanced Directive:   1. Do you have an Advanced Directive? NO    2. Would you like information on Advanced Directives?  NO

## 2018-07-11 NOTE — PATIENT INSTRUCTIONS
Medicare Wellness Visit, Female    The best way to live healthy is to have a lifestyle where you eat a well-balanced diet, exercise regularly, limit alcohol use, and quit all forms of tobacco/nicotine, if applicable. Regular preventive services are another way to keep healthy. Preventive services (vaccines, screening tests, monitoring & exams) can help personalize your care plan, which helps you manage your own care. Screening tests can find health problems at the earliest stages, when they are easiest to treat. [facility] follows the current, evidence-based guidelines published by the [facility] ([facility]) when recommending preventive services for our patients. Because we follow these guidelines, sometimes recommendations change over time as research supports it. (For example, mammograms used to be recommended annually. Even though Medicare will still pay for an annual mammogram, the newer guidelines recommend a mammogram every two years for women of average risk.)    Of course, you and your provider may decide to screen more often for some diseases, based on your risk and co-morbidities (chronic disease you are already diagnosed with). Preventive services for you include:    - Medicare offers their members a free annual wellness visit, which is time for you and your primary care provider to discuss and plan for your preventive service needs. Take advantage of this benefit every year!    -All people over age 72 should receive the recommended pneumonia vaccines. Current USPSTF guidelines recommend a series of two vaccines for the best pneumonia protection.     -All adults should have a yearly flu vaccine and a tetanus vaccine every 10 years. All adults age 61 years should receive a shingles vaccine once in their lifetime.      -A bone mass density test is recommended when a woman turns 65 to screen for osteoporosis.  This test is only recommended once as a screening. Some providers will use this same test as a disease monitoring tool if you already have osteoporosis. -All adults age 38-68 years who are overweight should have a diabetes screening test once every three years.     -Other screening tests & preventive services for persons with diabetes include: an eye exam to screen for diabetic retinopathy, a kidney function test, a foot exam, and stricter control over your cholesterol.     -Cardiovascular screening for adults with routine risk involves an electrocardiogram (ECG) at intervals determined by the provider.     -Colorectal cancer screenings should be done for adults age 54-65 years with normal risk. There are a number of acceptable methods of screening for this type of cancer. Each test has its own benefits and drawbacks. Discuss with your provider what is most appropriate for you during your annual wellness visit. The different tests include: colonoscopy (considered the best screening method), a fecal occult blood test, a fecal DNA test, and sigmoidoscopy. -Breast cancer screenings are recommended every other year for women of normal risk age 54-69 years.     -Cervical cancer screenings for women over age 72 are only recommended with certain risk factors.     -All adults born between [facility] should be screened once for Hepatitis C. Here is a list of your current Health Maintenance items (your personalized list of preventive services) with a due date: There are no preventive care reminders to display for this patient.

## 2018-07-11 NOTE — MR AVS SNAPSHOT
[address] [facility] [id number] [phone] Patient: Eric Grover MRN: [id number] ETF:[id number] Visit Information Date & Time Provider Department Dept. Phone Encounter #  
 7/11/2018 11:00 AM Linda Lynne, [employer] [phone] [id number] Upcoming Health Maintenance Date Due  
 MEDICARE YEARLY EXAM 7/20/2018 Influenza Age 5 to Adult 8/1/2018 GLAUCOMA SCREENING Q2Y 12/12/2019 DTaP/Tdap/Td series (2 - Td) 7/19/2027 Allergies as of 7/11/2018  Review Complete On: 7/11/2018 By: Linda Lynne MD  
 No Known Allergies Current Immunizations  Reviewed on 1/24/2018 Name Date Influenza High Dose Vaccine PF 9/13/2017, 10/12/2016 Influenza Vaccine 9/23/2015, 12/16/2014, 10/16/2013 Pneumococcal Conjugate (PCV-13) 1/18/2017 Pneumococcal Vaccine (Unspecified Type) 4/16/2009 Zoster Vaccine, Live 9/10/2014 Not reviewed this visit You Were Diagnosed With   
  
 Codes Comments Medicare annual wellness visit, subsequent    -  Primary ICD-10-CM: Z00.00 ICD-9-CM: V70.0 Vitals BP Pulse Temp Resp Height(growth percentile) Weight(growth percentile) 132/70 (BP 1 Location: Left arm, BP Patient Position: Sitting) 60 97.5 °F (36.4 °C) (Oral) 17 5' 2\" (1.575 m) 116 lb 6.4 oz (52.8 kg) SpO2 BMI OB Status Smoking Status 95% 21.29 kg/m2 Postmenopausal Never Smoker BMI and BSA Data Body Mass Index Body Surface Area  
 21.29 kg/m 2 1.52 m 2 Preferred Pharmacy Pharmacy Name Phone [address], [address] [facility] [phone] Your Updated Medication List  
  
   
This list is accurate as of 7/11/18 11:49 AM.  Always use your most recent med list.  
  
  
  
  
 aspirin delayed-release 81 mg tablet Take  by mouth daily. Calcium 600 + D tablet Generic drug:  calcium-cholecalciferol (D3) Take 1 Tab by mouth daily. cholecalciferol 1,000 unit Cap Commonly known as:  VITAMIN D3 Take  by mouth daily. FISH OIL 1,000 mg Cap Generic drug:  omega-3 fatty acids-vitamin e Take 1 Cap by mouth. ibuprofen 600 mg tablet Commonly known as:  MOTRIN  
  
 losartan 100 mg tablet Commonly known as:  COZAAR  
TAKE ONE TABLET BY MOUTH ONCE DAILY  
  
 methocarbamol 500 mg tablet Commonly known as:  ROBAXIN  
  
 sertraline 25 mg tablet Commonly known as:  ZOLOFT  
TAKE ONE TABLET BY MOUTH ONCE DAILY  
  
 sotalol 80 mg tablet Commonly known as:  Ryan Wilbert Take  by mouth daily. timolol 0.25 % ophthalmic solution Commonly known as:  TIMOPTIC Administer 1 Drop to both eyes two (2) times a day. XALATAN 0.005 % ophthalmic solution Generic drug:  latanoprost  
Administer 1 Drop to both eyes nightly. Patient Instructions Medicare Wellness Visit, Female The best way to live healthy is to have a lifestyle where you eat a well-balanced diet, exercise regularly, limit alcohol use, and quit all forms of tobacco/nicotine, if applicable. Regular preventive services are another way to keep healthy. Preventive services (vaccines, screening tests, monitoring & exams) can help personalize your care plan, which helps you manage your own care. Screening tests can find health problems at the earliest stages, when they are easiest to treat. [facility] follows the current, evidence-based guidelines published by the [facility] [place] (USPSTF) when recommending preventive services for our patients. Because we follow these guidelines, sometimes recommendations change over time as research supports it. (For example, mammograms used to be recommended annually.  Even though Medicare will still pay for an annual mammogram, the newer guidelines recommend a mammogram every two years for women of average risk.) Of course, you and your provider may decide to screen more often for some diseases, based on your risk and co-morbidities (chronic disease you are already diagnosed with). Preventive services for you include: - Medicare offers their members a free annual wellness visit, which is time for you and your primary care provider to discuss and plan for your preventive service needs. Take advantage of this benefit every year! 
 
-All people over age 72 should receive the recommended pneumonia vaccines. Current USPSTF guidelines recommend a series of two vaccines for the best pneumonia protection.  
 
-All adults should have a yearly flu vaccine and a tetanus vaccine every 10 years. All adults age 61 years should receive a shingles vaccine once in their lifetime.   
 
-A bone mass density test is recommended when a woman turns 65 to screen for osteoporosis. This test is only recommended once as a screening. Some providers will use this same test as a disease monitoring tool if you already have osteoporosis. -All adults age 38-68 years who are overweight should have a diabetes screening test once every three years.  
 
-Other screening tests & preventive services for persons with diabetes include: an eye exam to screen for diabetic retinopathy, a kidney function test, a foot exam, and stricter control over your cholesterol.  
 
-Cardiovascular screening for adults with routine risk involves an electrocardiogram (ECG) at intervals determined by the provider.  
 
-Colorectal cancer screenings should be done for adults age 54-65 years with normal risk. There are a number of acceptable methods of screening for this type of cancer. Each test has its own benefits and drawbacks. Discuss with your provider what is most appropriate for you during your annual wellness visit.  The different tests include: colonoscopy (considered the best screening method), a fecal occult blood test, a fecal DNA test, and sigmoidoscopy. -Breast cancer screenings are recommended every other year for women of normal risk age 54-69 years.  
 
-Cervical cancer screenings for women over age 72 are only recommended with certain risk factors.  
 
-All adults born between [facility] should be screened once for Hepatitis C. Here is a list of your current Health Maintenance items (your personalized list of preventive services) with a due date: There are no preventive care reminders to display for this patient. Introducing [facility]! Lillian Finch introduces Appbyme patient portal. Now you can access parts of your medical record, email your doctor's office, and request medication refills online. 1. In your internet browser, go to [email or web address] 2. Click on the First Time User? Click Here link in the Sign In box. You will see the New Member Sign Up page. 3. Enter your Appbyme Access Code exactly as it appears below. You will not need to use this code after youve completed the sign-up process. If you do not sign up before the expiration date, you must request a new code. · Appbyme Access Code: [email or web address] Expires: 9/7/2018  7:18 AM 
 
4. Enter the last four digits of your Social Security Number (xxxx) and Date of Birth (mm/dd/yyyy) as indicated and click Submit. You will be taken to the next sign-up page. 5. Create a Appbyme ID. This will be your Appbyme login ID and cannot be changed, so think of one that is secure and easy to remember. 6. Create a Appbyme password. You can change your password at any time. 7. Enter your Password Reset Question and Answer. This can be used at a later time if you forget your password. 8. Enter your e-mail address. You will receive e-mail notification when new information is available in [address] 9. Click Sign Up. You can now view and download portions of your medical record. 10. Click the Download Summary menu link to download a portable copy of your medical information. If you have questions, please visit the Frequently Asked Questions section of the BRIKA website. Remember, BRIKA is NOT to be used for urgent needs. For medical emergencies, dial 911. Now available from your iPhone and Android! Please provide this summary of care documentation to your next provider. Your primary care clinician is listed as Alona Diggs. If you have any questions after today's visit, please call [phone].

## 2018-07-11 NOTE — ACP (ADVANCE CARE PLANNING)
Advance Care Planning (ACP) Provider Conversation Snapshot    Date of ACP Conversation: 07/11/18  Persons included in Conversation:  patient  Length of ACP Conversation in minutes:  <16 minutes (Non-Billable)    Authorized Decision Maker (if patient is incapable of making informed decisions):    This person is:   Healthcare Agent/Medical Power of  under Advance Directive      Conversation Outcomes / Follow-Up Plan:   Recommended completion of Advance Directive form after review of ACP materials and conversation with prospective healthcare agent

## 2018-07-11 NOTE — PROGRESS NOTES
Assessment/Plan:    *Diagnoses and all orders for this visit:    1. Essential hypertension    2. Medicare annual wellness visit, subsequent    3. Pure hypercholesterolemia        F/u 6 months for HTN. The plan was discussed with the patient. The patient verbalized understanding and is in agreement with the plan. All medication potential side effects were discussed with the patient.    -------------------------------------------------------------------------------------------------------------------        Florecita Greer is a 80 y.o. female and presents with Hospital Follow Up         Subjective:  HTN: well controlled. HLD: stable. We had stopped her statin trying to reduce the amount of medication she was taking. Mils shift in her levels but fine. ROS:  Constitutional: No recent weight change. No weakness/fatigue. No f/c. Skin: No rashes, change in nails/hair, itching   HENT: No HA, dizziness. No hearing loss/tinnitus. No nasal congestion/discharge. Eyes: No change in vision, double/blurred vision or eye pain/redness. Cardiovascular: No CP/palpitations. No STEWART/orthopnea/PND. Respiratory: No cough/sputum, dyspnea, wheezing. Gastointestinal: No dysphagia, reflux. No n/v. No constipation/diarrhea. No melena/rectal bleeding. Genitourinary: No dysuria, urinary hesitancy, nocturia, hematuria. No incontinence. Musculoskeletal: No joint pain/stiffness. No muscle pain/tenderness. Endo: No heat/cold intolerance, no polyuria/polydypsia. Heme: No h/o anemia. No easy bleeding/bruising. Allergy/Immunology: No seasonal rhinitis. Denies frequent colds, sinus/ear infections. Neurological: No seizures/numbness/weakness. No paresthesias. Psychiatric:  No depression, anxiety. The problem list was updated as a part of today's visit.   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 SH:  Social History   Substance Use Topics    Smoking status: Never Smoker    Smokeless tobacco: Never Used    Alcohol use No       Medications/Allergies:  Current Outpatient Prescriptions on File Prior to Visit   Medication Sig Dispense Refill    sertraline (ZOLOFT) 25 mg tablet TAKE ONE TABLET BY MOUTH ONCE DAILY 90 Tab 1    losartan (COZAAR) 100 mg tablet TAKE ONE TABLET BY MOUTH ONCE DAILY 90 Tab 2    sotalol (BETAPACE) 80 mg tablet Take  by mouth daily.  Cholecalciferol, Vitamin D3, 1,000 unit cap Take  by mouth daily.  aspirin delayed-release 81 mg tablet Take  by mouth daily.  omega-3 fatty acids-vitamin e (FISH OIL) 1,000 mg Cap Take 1 Cap by mouth.  calcium-cholecalciferol, D3, (CALCIUM 600 + D) tablet Take 1 Tab by mouth daily.  latanoprost (XALATAN) 0.005 % ophthalmic solution Administer 1 Drop to both eyes nightly.  timolol (TIMOPTIC) 0.25 % ophthalmic solution Administer 1 Drop to both eyes two (2) times a day.  ibuprofen (MOTRIN) 600 mg tablet       methocarbamol (ROBAXIN) 500 mg tablet        No current facility-administered medications on file prior to visit.          No Known Allergies      Health Maintenance:   Health Maintenance   Topic Date Due    MEDICARE YEARLY EXAM  07/20/2018    Influenza Age 5 to Adult  08/01/2018    GLAUCOMA SCREENING Q2Y  12/12/2019    DTaP/Tdap/Td series (2 - Td) 07/19/2027    Bone Densitometry (Dexa) Screening  Completed    ZOSTER VACCINE AGE 60>  Completed    Pneumococcal 65+ Low/Medium Risk  Completed       Objective:  Visit Vitals    /70 (BP 1 Location: Left arm, BP Patient Position: Sitting)    Pulse 60    Temp 97.5 °F (36.4 °C) (Oral)    Resp 17    Ht 5' 2\" (1.575 m)    Wt 116 lb 6.4 oz (52.8 kg)    SpO2 95%    BMI 21.29 kg/m2          Nurses notes and VS reviewed. Physical Examination: General appearance - alert, well appearing, and in no distress  Ears - bilateral TM's and external ear canals normal  Mouth - mucous membranes moist, pharynx normal without lesions  Neck - supple, no significant adenopathy  Chest - clear to auscultation, no wheezes, rales or rhonchi, symmetric air entry  Heart - normal rate, regular rhythm, normal S1, S2, no murmurs, rubs, clicks or gallops  Abdomen - soft, nontender, nondistended, no masses or organomegaly  Musculoskeletal - no joint tenderness, deformity or swelling  Extremities - peripheral pulses normal, no pedal edema, no clubbing or cyanosis        Labwork and Ancillary Studies:    CBC w/Diff  Lab Results   Component Value Date/Time    WBC 10.9 07/02/2018 08:57 AM    HGB 13.6 07/02/2018 08:57 AM    PLATELET 297 12/25/7111 08:57 AM         Basic Metabolic Profile  Lab Results   Component Value Date/Time    Sodium 139 07/02/2018 08:57 AM    Potassium 4.5 07/02/2018 08:57 AM    Chloride 105 07/02/2018 08:57 AM    CO2 27 07/02/2018 08:57 AM    Anion gap 7 07/02/2018 08:57 AM    Glucose 124 (H) 07/02/2018 08:57 AM    BUN 23 (H) 07/02/2018 08:57 AM    Creatinine 1.35 (H) 07/02/2018 08:57 AM    BUN/Creatinine ratio 17 07/02/2018 08:57 AM    GFR est AA 44 (L) 07/02/2018 08:57 AM    GFR est non-AA 37 (L) 07/02/2018 08:57 AM    Calcium 8.6 07/02/2018 08:57 AM         LFT  Lab Results   Component Value Date/Time    ALT (SGPT) 18 07/02/2018 08:57 AM    AST (SGOT) 18 07/02/2018 08:57 AM    Alk.  phosphatase 65 07/02/2018 08:57 AM    Bilirubin, direct 0.1 07/02/2018 08:57 AM    Bilirubin, total 0.6 07/02/2018 08:57 AM         Cholesterol  Lab Results   Component Value Date/Time    Cholesterol, total 210 (H) 07/02/2018 08:57 AM    HDL Cholesterol 53 07/02/2018 08:57 AM    LDL, calculated 125.2 (H) 07/02/2018 08:57 AM    Triglyceride 159 (H) 07/02/2018 08:57 AM    CHOL/HDL Ratio 4.0 07/02/2018 08:57 AM

## 2018-07-12 ENCOUNTER — PATIENT OUTREACH (OUTPATIENT)
Dept: FAMILY MEDICINE CLINIC | Age: 83
End: 2018-07-12

## 2018-07-12 NOTE — PROGRESS NOTES
Pt has had no further hospital or ED admissions. Pt attended hospital follow up and is taking all medications as directed. Goals met. Navigation type closed. Episode resolved. No further follow up scheduled. Pt has my contact information.

## 2018-09-11 ENCOUNTER — IMPORTED ENCOUNTER (OUTPATIENT)
Dept: URBAN - METROPOLITAN AREA CLINIC 1 | Facility: CLINIC | Age: 83
End: 2018-09-11

## 2018-09-11 PROBLEM — H40.1121: Noted: 2018-09-11

## 2018-09-11 PROBLEM — H40.1112: Noted: 2018-09-11

## 2018-09-11 PROCEDURE — 92133 CPTRZD OPH DX IMG PST SGM ON: CPT

## 2018-09-11 PROCEDURE — 92012 INTRM OPH EXAM EST PATIENT: CPT

## 2018-09-11 NOTE — PATIENT DISCUSSION
1. COAG OU (Mod OD Mild OS) (0.85/0.8)- Increased IOP with stated compliance; OCT appears improved likely due to better quality of new machine. Continue Cosopt BID OU and Latanoprost OU QHS. Patient advised to be compliant with gtts. 2.  PCO OU: (Posterior Capsule Opacification)   Observe 3. Pseudophakia OU 4. H/o GR I Hypertensive Retinopathy OU 5. PETER w/ PEK OU- Cont BID OU Routinely. 6.  H/o Macular Drusen OU Return for an appointment in 6 mo 30 VF 24-2 OU with Dr. Sabra Bernardo.

## 2018-10-29 ENCOUNTER — TELEPHONE (OUTPATIENT)
Dept: FAMILY MEDICINE CLINIC | Age: 83
End: 2018-10-29

## 2018-10-29 RX ORDER — SERTRALINE HYDROCHLORIDE 25 MG/1
TABLET, FILM COATED ORAL
Qty: 90 TAB | Refills: 1 | Status: SHIPPED | OUTPATIENT
Start: 2018-10-29 | End: 2019-06-01 | Stop reason: SDUPTHER

## 2018-10-29 RX ORDER — SERTRALINE HYDROCHLORIDE 25 MG/1
TABLET, FILM COATED ORAL
Qty: 30 TAB | Refills: 0 | Status: SHIPPED | OUTPATIENT
Start: 2018-10-29 | End: 2018-10-29 | Stop reason: SDUPTHER

## 2018-10-29 NOTE — TELEPHONE ENCOUNTER
Pt is in need of a prescription refill of the following medication and was only prescribed 30 day supply and would like a call back with why that was changed and would like to be prescribed her regular dose of 90 day suppy, please advise.

## 2018-10-29 NOTE — TELEPHONE ENCOUNTER
I have fixed the RX and sent it in for her as 90 days. Notify pt that a covering physician had sent it in and when this happens,they will only send it in for 30 days.

## 2019-01-08 ENCOUNTER — TELEPHONE (OUTPATIENT)
Dept: FAMILY MEDICINE CLINIC | Age: 84
End: 2019-01-08

## 2019-01-08 ENCOUNTER — OFFICE VISIT (OUTPATIENT)
Dept: FAMILY MEDICINE CLINIC | Age: 84
End: 2019-01-08

## 2019-01-08 VITALS
TEMPERATURE: 97.9 F | HEART RATE: 58 BPM | BODY MASS INDEX: 21.35 KG/M2 | SYSTOLIC BLOOD PRESSURE: 164 MMHG | DIASTOLIC BLOOD PRESSURE: 62 MMHG | WEIGHT: 116 LBS | HEIGHT: 62 IN | OXYGEN SATURATION: 96 % | RESPIRATION RATE: 16 BRPM

## 2019-01-08 DIAGNOSIS — E78.00 PURE HYPERCHOLESTEROLEMIA: Primary | ICD-10-CM

## 2019-01-08 DIAGNOSIS — I10 ESSENTIAL HYPERTENSION: ICD-10-CM

## 2019-01-08 RX ORDER — LOSARTAN POTASSIUM 100 MG/1
TABLET ORAL
Qty: 90 TAB | Refills: 2 | Status: SHIPPED | OUTPATIENT
Start: 2019-01-08 | End: 2019-12-02 | Stop reason: SDUPTHER

## 2019-01-08 RX ORDER — DORZOLAMIDE HYDROCHLORIDE AND TIMOLOL MALEATE 20; 5 MG/ML; MG/ML
1 SOLUTION/ DROPS OPHTHALMIC 2 TIMES DAILY
COMMUNITY
Start: 2018-12-10 | End: 2019-01-08

## 2019-01-08 NOTE — PATIENT INSTRUCTIONS
High Blood Pressure: Care Instructions Your Care Instructions If your blood pressure is usually above 130/80, you have high blood pressure, or hypertension. That means the top number is 130 or higher or the bottom number is 80 or higher, or both. Despite what a lot of people think, high blood pressure usually doesn't cause headaches or make you feel dizzy or lightheaded. It usually has no symptoms. But it does increase your risk for heart attack, stroke, and kidney or eye damage. The higher your blood pressure, the more your risk increases. Your doctor will give you a goal for your blood pressure. Your goal will be based on your health and your age. Lifestyle changes, such as eating healthy and being active, are always important to help lower blood pressure. You might also take medicine to reach your blood pressure goal. 
Follow-up care is a key part of your treatment and safety. Be sure to make and go to all appointments, and call your doctor if you are having problems. It's also a good idea to know your test results and keep a list of the medicines you take. How can you care for yourself at home? Medical treatment · If you stop taking your medicine, your blood pressure will go back up. You may take one or more types of medicine to lower your blood pressure. Be safe with medicines. Take your medicine exactly as prescribed. Call your doctor if you think you are having a problem with your medicine. · Talk to your doctor before you start taking aspirin every day. Aspirin can help certain people lower their risk of a heart attack or stroke. But taking aspirin isn't right for everyone, because it can cause serious bleeding. · See your doctor regularly. You may need to see the doctor more often at first or until your blood pressure comes down. · If you are taking blood pressure medicine, talk to your doctor before you take decongestants or anti-inflammatory medicine, such as ibuprofen. Some of these medicines can raise blood pressure. · Learn how to check your blood pressure at home. Lifestyle changes · Stay at a healthy weight. This is especially important if you put on weight around the waist. Losing even 10 pounds can help you lower your blood pressure. · If your doctor recommends it, get more exercise. Walking is a good choice. Bit by bit, increase the amount you walk every day. Try for at least 30 minutes on most days of the week. You also may want to swim, bike, or do other activities. · Avoid or limit alcohol. Talk to your doctor about whether you can drink any alcohol. · Try to limit how much sodium you eat to less than 2,300 milligrams (mg) a day. Your doctor may ask you to try to eat less than 1,500 mg a day. · Eat plenty of fruits (such as bananas and oranges), vegetables, legumes, whole grains, and low-fat dairy products. · Lower the amount of saturated fat in your diet. Saturated fat is found in animal products such as milk, cheese, and meat. Limiting these foods may help you lose weight and also lower your risk for heart disease. · Do not smoke. Smoking increases your risk for heart attack and stroke. If you need help quitting, talk to your doctor about stop-smoking programs and medicines. These can increase your chances of quitting for good. When should you call for help? Call 911 anytime you think you may need emergency care. This may mean having symptoms that suggest that your blood pressure is causing a serious heart or blood vessel problem. Your blood pressure may be over 180/120. 
 For example, call 911 if: 
  · You have symptoms of a heart attack. These may include: 
? Chest pain or pressure, or a strange feeling in the chest. 
? Sweating. ? Shortness of breath. ? Nausea or vomiting. ? Pain, pressure, or a strange feeling in the back, neck, jaw, or upper belly or in one or both shoulders or arms. ? Lightheadedness or sudden weakness. ? A fast or irregular heartbeat.  
  · You have symptoms of a stroke. These may include: 
? Sudden numbness, tingling, weakness, or loss of movement in your face, arm, or leg, especially on only one side of your body. ? Sudden vision changes. ? Sudden trouble speaking. ? Sudden confusion or trouble understanding simple statements. ? Sudden problems with walking or balance. ? A sudden, severe headache that is different from past headaches.  
  · You have severe back or belly pain.  
 Do not wait until your blood pressure comes down on its own. Get help right away. 
 Call your doctor now or seek immediate care if: 
  · Your blood pressure is much higher than normal (such as 180/120 or higher), but you don't have symptoms.  
  · You think high blood pressure is causing symptoms, such as: 
? Severe headache. 
? Blurry vision.  
 Watch closely for changes in your health, and be sure to contact your doctor if: 
  · Your blood pressure measures higher than your doctor recommends at least 2 times. That means the top number is higher or the bottom number is higher, or both.  
  · You think you may be having side effects from your blood pressure medicine. Where can you learn more? Go to http://deanna-antionette.info/. Enter K487 in the search box to learn more about \"High Blood Pressure: Care Instructions. \" Current as of: December 6, 2017 Content Version: 11.8 © 9948-1849 [employer]. Care instructions adapted under license by [email or web address] (which disclaims liability or warranty for this information). If you have questions about a medical condition or this instruction, always ask your healthcare professional. [place] [address] any warranty or liability for your use of this information.

## 2019-01-08 NOTE — TELEPHONE ENCOUNTER
Pt called to give a message for Lisa. She said that she was talking with her earlier when she was here for her appointment. She wanted Delman Parent to know that her Fotalol medication from Dr. John Drummond is 80mg and she takes 1 tablet twice a day.

## 2019-01-08 NOTE — PROGRESS NOTES
Assessment/Plan: *Diagnoses and all orders for this visit: 1. Pure hypercholesterolemia 2. Essential hypertension 
-     losartan (COZAAR) 100 mg tablet; TAKE ONE TABLET BY MOUTH ONCE DAILY Will not make any changes to her meds just yet. She is very stressed and I feel her BP is all due to this. F/u 1 month and repeat. The plan was discussed with the patient. The patient verbalized understanding and is in agreement with the plan. All medication potential side effects were discussed with the patient. 
 
------------------------------------------------------------------------------------------------------------------- Simon Eubanks is a 80 y.o. female and presents with Hypertension Subjective: 
Pt here for f/u. HLD: stable on last BW. HTN: elevated today. 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 ROS: 
Review of Systems - Negative The problem list was updated as a part of today's visit. Patient Active Problem List  
Diagnosis Code  Hyperlipidemia E78.5  Hypertension I10  
 HH (hiatus hernia) K44.9  
 OA (osteoarthritis) M19.90  
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  
 Osteoporosis M81.0  Glaucoma H40.9  Cataract H26.9  History of DVT (deep vein thrombosis) Z86.718  Atrial fibrillation ([facility] [address] I48.91 The PSH, FH were reviewed. SH: Social History Tobacco Use  Smoking status: Never Smoker  Smokeless tobacco: Never Used Substance Use Topics  Alcohol use: No  
 Drug use: No  
 
 
Medications/Allergies: 
Current Outpatient Medications on File Prior to Visit Medication Sig Dispense Refill  sertraline (ZOLOFT) 25 mg tablet TAKE 1 TABLET BY MOUTH ONCE DAILY 90 Tab 1  
 methocarbamol (ROBAXIN) 500 mg tablet  sotalol (BETAPACE) 80 mg tablet Take  by mouth daily.  Cholecalciferol, Vitamin D3, 1,000 unit cap Take  by mouth daily.  aspirin delayed-release 81 mg tablet Take  by mouth daily.  omega-3 fatty acids-vitamin e (FISH OIL) 1,000 mg Cap Take 1 Cap by mouth.  calcium-cholecalciferol, D3, (CALCIUM 600 + D) tablet Take 1 Tab by mouth daily.  latanoprost (XALATAN) 0.005 % ophthalmic solution Administer 1 Drop to both eyes nightly.  timolol (TIMOPTIC) 0.25 % ophthalmic solution Administer 1 Drop to both eyes two (2) times a day. No current facility-administered medications on file prior to visit. No Known Allergies Health Maintenance:  
Health Maintenance Topic Date Due  Shingrix Vaccine Age 50> (1 of 2) 01/31/1976  Influenza Age 5 to Adult  08/01/2018  MEDICARE YEARLY EXAM  07/12/2019  GLAUCOMA SCREENING Q2Y  12/12/2019  
 DTaP/Tdap/Td series (2 - Td) 07/19/2027  Bone Densitometry (Dexa) Screening  Completed  Pneumococcal 65+ Low/Medium Risk  Completed Objective: 
Visit Vitals /62 (BP 1 Location: Right arm, BP Patient Position: Sitting) Comment (BP Patient Position): repeat due to high systolic, pcp aware Pulse (!) 58 Temp 97.9 °F (36.6 °C) (Oral) Resp 16 Ht 5' 2\" (1.575 m) Wt 116 lb (52.6 kg) SpO2 96% BMI 21.22 kg/m² Nurses notes and VS reviewed. Physical Examination: General appearance - alert, well appearing, and in no distress Chest - clear to auscultation, no wheezes, rales or rhonchi, symmetric air entry Heart - normal rate, regular rhythm, normal S1, S2, no murmurs, rubs, clicks or gallops Labwork and Ancillary Studies: CBC w/Diff Lab Results Component Value Date/Time WBC 10.9 07/02/2018 08:57 AM  
 HGB 13.6 07/02/2018 08:57 AM  
 PLATELET 939 06/55/3951 08:57 AM  
  
 
 Basic Metabolic Profile Lab Results Component Value Date/Time  Sodium 139 07/02/2018 08:57 AM  
 Potassium 4.5 07/02/2018 08:57 AM  
 Chloride 105 07/02/2018 08:57 AM  
 CO2 27 07/02/2018 08:57 AM  
 Anion gap 7 07/02/2018 08:57 AM  
 Glucose 124 (H) 07/02/2018 08:57 AM  
 BUN 23 (H) 07/02/2018 08:57 AM  
 Creatinine 1.35 (H) 07/02/2018 08:57 AM  
 BUN/Creatinine ratio 17 07/02/2018 08:57 AM  
 GFR est AA 44 (L) 07/02/2018 08:57 AM  
 GFR est non-AA 37 (L) 07/02/2018 08:57 AM  
 Calcium 8.6 07/02/2018 08:57 AM  
  
  
LFT Lab Results Component Value Date/Time ALT (SGPT) 18 07/02/2018 08:57 AM  
 AST (SGOT) 18 07/02/2018 08:57 AM  
 Alk. phosphatase 65 07/02/2018 08:57 AM  
 Bilirubin, direct 0.1 07/02/2018 08:57 AM  
 Bilirubin, total 0.6 07/02/2018 08:57 AM  
 
 
 
Cholesterol Lab Results Component Value Date/Time  Cholesterol, total 210 (H) 07/02/2018 08:57 AM  
 HDL Cholesterol 53 07/02/2018 08:57 AM  
 LDL, calculated 125.2 (H) 07/02/2018 08:57 AM  
 Triglyceride 159 (H) 07/02/2018 08:57 AM  
 CHOL/HDL Ratio 4.0 07/02/2018 08:57 AM

## 2019-01-08 NOTE — PROGRESS NOTES
Kylah Espinoza is a 80 y.o. female (: 1926) presenting to address: Chief Complaint Patient presents with  Hypertension Vitals:  
 19 1123 BP: 170/64 Pulse: (!) 58 Resp: 16 Temp: 97.9 °F (36.6 °C) TempSrc: Oral  
SpO2: 96% Weight: 116 lb (52.6 kg) Height: 5' 2\" (1.575 m) PainSc:   0 - No pain Hearing/Vision: No exam data present Learning Assessment:  
 
Learning Assessment 2016 PRIMARY LEARNER Patient HIGHEST LEVEL OF EDUCATION - PRIMARY LEARNER  GRADUATED HIGH SCHOOL OR GED  
BARRIERS PRIMARY LEARNER NONE  
CO-LEARNER CAREGIVER No  
PRIMARY LANGUAGE ENGLISH  
LEARNER PREFERENCE PRIMARY DEMONSTRATION  
ANSWERED BY self RELATIONSHIP SELF Depression Screening: PHQ over the last two weeks 2019 Little interest or pleasure in doing things Not at all Feeling down, depressed, irritable, or hopeless Not at all Total Score PHQ 2 0 Fall Risk Assessment:  
 
Fall Risk Assessment, last 12 mths 2019 Able to walk? Yes Fall in past 12 months? No  
Fall with injury? -  
Number of falls in past 12 months -  
 
Abuse Screening:  
 
Abuse Screening Questionnaire 2015 Do you ever feel afraid of your partner? Jazmine Pierce Are you in a relationship with someone who physically or mentally threatens you? Jazmine Pierce Is it safe for you to go home? Wilber Haddad Coordination of Care Questionaire: 1. Have you been to the ER, urgent care clinic since your last visit? Hospitalized since your last visit? Yes, [facility] for sciatica 2. Have you seen or consulted any other health care providers outside of the [address] [place] [address] since your last visit? Include any pap smears or colon screening. No  
 
Advanced Directive: 1. Do you have an Advanced Directive? No  
2. Would you like information on Advanced Directives? No  
 
 
Health Maintenance Due Topic Date Due  Shingrix Vaccine Age 50> (1 of 2) 1976  Influenza Age 5 to Adult  08/01/2018 Flu shot October 2018

## 2019-01-09 RX ORDER — SOTALOL HYDROCHLORIDE 80 MG/1
80 TABLET ORAL 2 TIMES DAILY
COMMUNITY
Start: 2019-01-09

## 2019-02-12 ENCOUNTER — OFFICE VISIT (OUTPATIENT)
Dept: FAMILY MEDICINE CLINIC | Age: 84
End: 2019-02-12

## 2019-02-12 VITALS
HEART RATE: 61 BPM | BODY MASS INDEX: 21.35 KG/M2 | WEIGHT: 116 LBS | DIASTOLIC BLOOD PRESSURE: 60 MMHG | RESPIRATION RATE: 18 BRPM | TEMPERATURE: 97.4 F | SYSTOLIC BLOOD PRESSURE: 180 MMHG | HEIGHT: 62 IN | OXYGEN SATURATION: 96 %

## 2019-02-12 DIAGNOSIS — I10 ESSENTIAL HYPERTENSION: Primary | ICD-10-CM

## 2019-02-12 RX ORDER — AMLODIPINE BESYLATE 2.5 MG/1
2.5 TABLET ORAL DAILY
Qty: 90 TAB | Refills: 0 | Status: SHIPPED | OUTPATIENT
Start: 2019-02-12 | End: 2019-05-10 | Stop reason: SDUPTHER

## 2019-02-12 NOTE — PROGRESS NOTES
Eliot Crabtree is a 80 y.o. female (: 1926) presenting to address:    Chief Complaint   Patient presents with    Hypertension     follow up        Vitals:    19 1116   BP: 190/76   Pulse: 61   Resp: 18   Temp: 97.4 °F (36.3 °C)   TempSrc: Oral   SpO2: 96%   Weight: 116 lb (52.6 kg)   Height: 5' 2\" (1.575 m)   PainSc:   0 - No pain       Hearing/Vision:   No exam data present    Learning Assessment:     Learning Assessment 2016   PRIMARY LEARNER Patient   HIGHEST LEVEL OF EDUCATION - PRIMARY LEARNER  GRADUATED HIGH SCHOOL OR GED   BARRIERS PRIMARY LEARNER NONE   CO-LEARNER CAREGIVER No   PRIMARY LANGUAGE ENGLISH   LEARNER PREFERENCE PRIMARY DEMONSTRATION   ANSWERED BY self   RELATIONSHIP SELF     Depression Screening:     3 most recent PHQ Screens 2019   Little interest or pleasure in doing things Not at all   Feeling down, depressed, irritable, or hopeless Not at all   Total Score PHQ 2 0     Fall Risk Assessment:     Fall Risk Assessment, last 12 mths 2019   Able to walk? Yes   Fall in past 12 months? No   Fall with injury? -   Number of falls in past 12 months -     Abuse Screening:     Abuse Screening Questionnaire 2015   Do you ever feel afraid of your partner? N   Are you in a relationship with someone who physically or mentally threatens you? N   Is it safe for you to go home? Y     Coordination of Care Questionaire:   1. Have you been to the ER, urgent care clinic since your last visit? Hospitalized since your last visit? NO    2. Have you seen or consulted any other health care providers outside of the [address] [place] [address] since your last visit? Include any pap smears or colon screening. NO    Advanced Directive:   1. Do you have an Advanced Directive? NO    2. Would you like information on Advanced Directives?  NO

## 2019-02-12 NOTE — PROGRESS NOTES
Assessment/Plan:    *Diagnoses and all orders for this visit:    1. Essential hypertension  -     amLODIPine (NORVASC) 2.5 mg tablet; Take 1 Tab by mouth daily. Will add Norvasc. F/u in 4 weeks. Advised pt to see her cardiologist for f/u as well since the BP is not controlled. The plan was discussed with the patient. The patient verbalized understanding and is in agreement with the plan. All medication potential side effects were discussed with the patient.    -------------------------------------------------------------------------------------------------------------------        Julissa Ospina is a 80 y.o. female and presents with Hypertension (follow up )         Subjective:  Pt here for f/u of her BP. Not well controlled. Is taking her meds regularly w/o issue. Sees her cardiologist every June and gets an EKG. Her visits with them are always good. ROS:  Review of Systems - Negative         The problem list was updated as a part of today's visit. 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 SH:  Social History     Tobacco Use    Smoking status: Never Smoker    Smokeless tobacco: Never Used   Substance Use Topics    Alcohol use: No    Drug use: No       Medications/Allergies:  Current Outpatient Medications on File Prior to Visit   Medication Sig Dispense Refill    sotalol (BETAPACE) 80 mg tablet Take 1 Tab by mouth two (2) times a day.       losartan (COZAAR) 100 mg tablet TAKE ONE TABLET BY MOUTH ONCE DAILY 90 Tab 2    sertraline (ZOLOFT) 25 mg tablet TAKE 1 TABLET BY MOUTH ONCE DAILY 90 Tab 1    methocarbamol (ROBAXIN) 500 mg tablet  Cholecalciferol, Vitamin D3, 1,000 unit cap Take  by mouth daily.  aspirin delayed-release 81 mg tablet Take  by mouth daily.  omega-3 fatty acids-vitamin e (FISH OIL) 1,000 mg Cap Take 1 Cap by mouth.  calcium-cholecalciferol, D3, (CALCIUM 600 + D) tablet Take 1 Tab by mouth daily.  latanoprost (XALATAN) 0.005 % ophthalmic solution Administer 1 Drop to both eyes nightly.  timolol (TIMOPTIC) 0.25 % ophthalmic solution Administer 1 Drop to both eyes two (2) times a day. No current facility-administered medications on file prior to visit. No Known Allergies      Health Maintenance:   Health Maintenance   Topic Date Due    Shingrix Vaccine Age 49> (1 of 2) 01/31/1976    Influenza Age 5 to Adult  08/01/2018    MEDICARE YEARLY EXAM  07/12/2019    GLAUCOMA SCREENING Q2Y  12/12/2019    DTaP/Tdap/Td series (2 - Td) 07/19/2027    Bone Densitometry (Dexa) Screening  Completed    Pneumococcal 65+ Low/Medium Risk  Completed       Objective:  Visit Vitals  /60 (BP 1 Location: Right arm, BP Patient Position: Sitting)   Pulse 61   Temp 97.4 °F (36.3 °C) (Oral)   Resp 18   Ht 5' 2\" (1.575 m)   Wt 116 lb (52.6 kg)   SpO2 96%   BMI 21.22 kg/m²          Nurses notes and VS reviewed.       Physical Examination: General appearance - alert, well appearing, and in no distress  Chest - clear to auscultation, no wheezes, rales or rhonchi, symmetric air entry  Heart - normal rate, regular rhythm, normal S1, S2, no murmurs, rubs, clicks or gallops          Labwork and Ancillary Studies:    CBC w/Diff  Lab Results   Component Value Date/Time    WBC 10.9 07/02/2018 08:57 AM    HGB 13.6 07/02/2018 08:57 AM    PLATELET 544 93/61/5924 08:57 AM         Basic Metabolic Profile  Lab Results   Component Value Date/Time    Sodium 139 07/02/2018 08:57 AM    Potassium 4.5 07/02/2018 08:57 AM    Chloride 105 07/02/2018 08:57 AM    CO2 27 07/02/2018 08:57 AM    Anion gap 7 07/02/2018 08:57 AM Glucose 124 (H) 07/02/2018 08:57 AM    BUN 23 (H) 07/02/2018 08:57 AM    Creatinine 1.35 (H) 07/02/2018 08:57 AM    BUN/Creatinine ratio 17 07/02/2018 08:57 AM    GFR est AA 44 (L) 07/02/2018 08:57 AM    GFR est non-AA 37 (L) 07/02/2018 08:57 AM    Calcium 8.6 07/02/2018 08:57 AM         LFT  Lab Results   Component Value Date/Time    ALT (SGPT) 18 07/02/2018 08:57 AM    AST (SGOT) 18 07/02/2018 08:57 AM    Alk.  phosphatase 65 07/02/2018 08:57 AM    Bilirubin, direct 0.1 07/02/2018 08:57 AM    Bilirubin, total 0.6 07/02/2018 08:57 AM         Cholesterol  Lab Results   Component Value Date/Time    Cholesterol, total 210 (H) 07/02/2018 08:57 AM    HDL Cholesterol 53 07/02/2018 08:57 AM    LDL, calculated 125.2 (H) 07/02/2018 08:57 AM    Triglyceride 159 (H) 07/02/2018 08:57 AM    CHOL/HDL Ratio 4.0 07/02/2018 08:57 AM

## 2019-02-12 NOTE — PATIENT INSTRUCTIONS
High Blood Pressure: Care Instructions  Overview    It's normal for blood pressure to go up and down throughout the day. But if it stays up, you have high blood pressure. Another name for high blood pressure is hypertension. Despite what a lot of people think, high blood pressure usually doesn't cause headaches or make you feel dizzy or lightheaded. It usually has no symptoms. But it does increase your risk of stroke, heart attack, and other problems. You and your doctor will talk about your risks of these problems based on your blood pressure. Your doctor will give you a goal for your blood pressure. Your goal will be based on your health and your age. Lifestyle changes, such as eating healthy and being active, are always important to help lower blood pressure. You might also take medicine to reach your blood pressure goal.  Follow-up care is a key part of your treatment and safety. Be sure to make and go to all appointments, and call your doctor if you are having problems. It's also a good idea to know your test results and keep a list of the medicines you take. How can you care for yourself at home? Medical treatment  · If you stop taking your medicine, your blood pressure will go back up. You may take one or more types of medicine to lower your blood pressure. Be safe with medicines. Take your medicine exactly as prescribed. Call your doctor if you think you are having a problem with your medicine. · Talk to your doctor before you start taking aspirin every day. Aspirin can help certain people lower their risk of a heart attack or stroke. But taking aspirin isn't right for everyone, because it can cause serious bleeding. · See your doctor regularly. You may need to see the doctor more often at first or until your blood pressure comes down. · If you are taking blood pressure medicine, talk to your doctor before you take decongestants or anti-inflammatory medicine, such as ibuprofen.  Some of these medicines can raise blood pressure. · Learn how to check your blood pressure at home. Lifestyle changes  · Stay at a healthy weight. This is especially important if you put on weight around the waist. Losing even 10 pounds can help you lower your blood pressure. · If your doctor recommends it, get more exercise. Walking is a good choice. Bit by bit, increase the amount you walk every day. Try for at least 30 minutes on most days of the week. You also may want to swim, bike, or do other activities. · Avoid or limit alcohol. Talk to your doctor about whether you can drink any alcohol. · Try to limit how much sodium you eat to less than 2,300 milligrams (mg) a day. Your doctor may ask you to try to eat less than 1,500 mg a day. · Eat plenty of fruits (such as bananas and oranges), vegetables, legumes, whole grains, and low-fat dairy products. · Lower the amount of saturated fat in your diet. Saturated fat is found in animal products such as milk, cheese, and meat. Limiting these foods may help you lose weight and also lower your risk for heart disease. · Do not smoke. Smoking increases your risk for heart attack and stroke. If you need help quitting, talk to your doctor about stop-smoking programs and medicines. These can increase your chances of quitting for good. When should you call for help? Call 911 anytime you think you may need emergency care. This may mean having symptoms that suggest that your blood pressure is causing a serious heart or blood vessel problem. Your blood pressure may be over 180/120.   For example, call 911 if:    · You have symptoms of a heart attack. These may include:  ? Chest pain or pressure, or a strange feeling in the chest.  ? Sweating. ? Shortness of breath. ? Nausea or vomiting. ? Pain, pressure, or a strange feeling in the back, neck, jaw, or upper belly or in one or both shoulders or arms. ? Lightheadedness or sudden weakness.   ? A fast or irregular heartbeat.     · You have symptoms of a stroke. These may include:  ? Sudden numbness, tingling, weakness, or loss of movement in your face, arm, or leg, especially on only one side of your body. ? Sudden vision changes. ? Sudden trouble speaking. ? Sudden confusion or trouble understanding simple statements. ? Sudden problems with walking or balance. ? A sudden, severe headache that is different from past headaches.     · You have severe back or belly pain.    Do not wait until your blood pressure comes down on its own. Get help right away.   Call your doctor now or seek immediate care if:    · Your blood pressure is much higher than normal (such as 180/120 or higher), but you don't have symptoms.     · You think high blood pressure is causing symptoms, such as:  ? Severe headache.  ? Blurry vision.    Watch closely for changes in your health, and be sure to contact your doctor if:    · Your blood pressure measures higher than your doctor recommends at least 2 times. That means the top number is higher or the bottom number is higher, or both.     · You think you may be having side effects from your blood pressure medicine. Where can you learn more? Go to http://deanna-antionette.info/. Enter D476 in the search box to learn more about \"High Blood Pressure: Care Instructions. \"  Current as of: July 22, 2018  Content Version: 11.9  © 3163-0919 [employer]. Care instructions adapted under license by [employer] (which disclaims liability or warranty for this information). If you have questions about a medical condition or this instruction, always ask your healthcare professional. [place] [address] any warranty or liability for your use of this information.

## 2019-03-13 ENCOUNTER — OFFICE VISIT (OUTPATIENT)
Dept: FAMILY MEDICINE CLINIC | Age: 84
End: 2019-03-13

## 2019-03-13 VITALS
SYSTOLIC BLOOD PRESSURE: 148 MMHG | RESPIRATION RATE: 17 BRPM | BODY MASS INDEX: 21.9 KG/M2 | HEART RATE: 70 BPM | DIASTOLIC BLOOD PRESSURE: 60 MMHG | WEIGHT: 119 LBS | OXYGEN SATURATION: 95 % | TEMPERATURE: 98 F | HEIGHT: 62 IN

## 2019-03-13 DIAGNOSIS — E55.9 HYPOVITAMINOSIS D: ICD-10-CM

## 2019-03-13 DIAGNOSIS — I10 ESSENTIAL HYPERTENSION: Primary | ICD-10-CM

## 2019-03-13 NOTE — PROGRESS NOTES
Julissa Ospina is a 80 y.o. female (: 1926) presenting to address:    Chief Complaint   Patient presents with    Hypertension       Vitals:    19 1059   BP: 164/60   Pulse: 70   Resp: 17   Temp: 98 °F (36.7 °C)   SpO2: 95%   Weight: 119 lb (54 kg)   Height: 5' 2\" (1.575 m)       Hearing/Vision:   No exam data present    Learning Assessment:     Learning Assessment 2016   PRIMARY LEARNER Patient   HIGHEST LEVEL OF EDUCATION - PRIMARY LEARNER  GRADUATED HIGH SCHOOL OR GED   BARRIERS PRIMARY LEARNER NONE   CO-LEARNER CAREGIVER No   PRIMARY LANGUAGE ENGLISH   LEARNER PREFERENCE PRIMARY DEMONSTRATION   ANSWERED BY self   RELATIONSHIP SELF     Depression Screening:     3 most recent PHQ Screens 2019   Little interest or pleasure in doing things Not at all   Feeling down, depressed, irritable, or hopeless Not at all   Total Score PHQ 2 0     Fall Risk Assessment:     Fall Risk Assessment, last 12 mths 2019   Able to walk? Yes   Fall in past 12 months? No   Fall with injury? -   Number of falls in past 12 months -     Abuse Screening:     Abuse Screening Questionnaire 2015   Do you ever feel afraid of your partner? N   Are you in a relationship with someone who physically or mentally threatens you? N   Is it safe for you to go home? Y     Coordination of Care Questionaire:   1. Have you been to the ER, urgent care clinic since your last visit? Hospitalized since your last visit? No     2. Have you seen or consulted any other health care providers outside of the [address] [place] [address] since your last visit? Include any pap smears or colon screening. Yes Dr Jonathan King cardiology 2019, Dr. Neisha Matias this week     Advanced Directive:   1. Do you have an Advanced Directive? No     2. Would you like information on Advanced Directives?   No       Health Maintenance Due   Topic Date Due    Shingrix Vaccine Age 49> (1 of 2) 1976

## 2019-03-13 NOTE — PROGRESS NOTES
Assessment/Plan:    *Diagnoses and all orders for this visit:    1. Essential hypertension  -     CBC WITH AUTOMATED DIFF; Future  -     HEPATIC FUNCTION PANEL; Future  -     LIPID PANEL; Future  -     METABOLIC PANEL, BASIC; Future  -     TSH 3RD GENERATION; Future  -     T4, FREE; Future  -     URINALYSIS W/ RFLX MICROSCOPIC; Future    2. Hypovitaminosis D  -     VITAMIN D, 25 HYDROXY; Future         Physical in July, BW prior. The plan was discussed with the patient. The patient verbalized understanding and is in agreement with the plan. All medication potential side effects were discussed with the patient.    -------------------------------------------------------------------------------------------------------------------        Helaine Fothergill is a 80 y.o. female and presents with Hypertension         Subjective:  Pt here for f/u of her HTN. Has been doing well, has been to see her cardiologist, Dr. Mary Ann. Her BP is stable, controlled. ROS:  Review of Systems - Negative         The problem list was updated as a part of today's visit. 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 SH:  Social History     Tobacco Use    Smoking status: Never Smoker    Smokeless tobacco: Never Used   Substance Use Topics    Alcohol use: No    Drug use: No       Medications/Allergies:  Current Outpatient Medications on File Prior to Visit   Medication Sig Dispense Refill    amLODIPine (NORVASC) 2.5 mg tablet Take 1 Tab by mouth daily. 90 Tab 0    sotalol (BETAPACE) 80 mg tablet Take 1 Tab by mouth two (2) times a day.       losartan (COZAAR) 100 mg tablet TAKE ONE TABLET BY MOUTH ONCE DAILY 90 Tab 2    sertraline (ZOLOFT) 25 mg tablet TAKE 1 TABLET BY MOUTH ONCE DAILY 90 Tab 1    methocarbamol (ROBAXIN) 500 mg tablet       Cholecalciferol, Vitamin D3, 1,000 unit cap Take  by mouth daily.  aspirin delayed-release 81 mg tablet Take  by mouth daily.  omega-3 fatty acids-vitamin e (FISH OIL) 1,000 mg Cap Take 1 Cap by mouth.  calcium-cholecalciferol, D3, (CALCIUM 600 + D) tablet Take 1 Tab by mouth daily.  latanoprost (XALATAN) 0.005 % ophthalmic solution Administer 1 Drop to both eyes nightly.  timolol (TIMOPTIC) 0.25 % ophthalmic solution Administer 1 Drop to both eyes two (2) times a day. No current facility-administered medications on file prior to visit. No Known Allergies      Health Maintenance:   Health Maintenance   Topic Date Due    Shingrix Vaccine Age 49> (1 of 2) 01/31/1976    MEDICARE YEARLY EXAM  07/12/2019    GLAUCOMA SCREENING Q2Y  12/12/2019    DTaP/Tdap/Td series (2 - Td) 07/19/2027    Bone Densitometry (Dexa) Screening  Completed    Pneumococcal 65+ Low/Medium Risk  Completed    Influenza Age 5 to Adult  Completed       Objective:  Visit Vitals  /60   Pulse 70   Temp 98 °F (36.7 °C)   Resp 17   Ht 5' 2\" (1.575 m)   Wt 119 lb (54 kg)   SpO2 95%   BMI 21.77 kg/m²          Nurses notes and VS reviewed.       Physical Examination: General appearance - alert, well appearing, and in no distress  Chest - clear to auscultation, no wheezes, rales or rhonchi, symmetric air entry  Heart - normal rate, regular rhythm, normal S1, S2, no murmurs, rubs, clicks or gallops          Labwork and Ancillary Studies:    CBC w/Diff  Lab Results   Component Value Date/Time    WBC 10.9 07/02/2018 08:57 AM    HGB 13.6 07/02/2018 08:57 AM    PLATELET 003 02/21/6672 08:57 AM         Basic Metabolic Profile  Lab Results   Component Value Date/Time    Sodium 139 07/02/2018 08:57 AM    Potassium 4.5 07/02/2018 08:57 AM    Chloride 105 07/02/2018 08:57 AM    CO2 27 07/02/2018 08:57 AM    Anion gap 7 07/02/2018 08:57 AM    Glucose 124 (H) 07/02/2018 08:57 AM    BUN 23 (H) 07/02/2018 08:57 AM    Creatinine 1.35 (H) 07/02/2018 08:57 AM    BUN/Creatinine ratio 17 07/02/2018 08:57 AM    GFR est AA 44 (L) 07/02/2018 08:57 AM    GFR est non-AA 37 (L) 07/02/2018 08:57 AM    Calcium 8.6 07/02/2018 08:57 AM         LFT  Lab Results   Component Value Date/Time    ALT (SGPT) 18 07/02/2018 08:57 AM    AST (SGOT) 18 07/02/2018 08:57 AM    Alk.  phosphatase 65 07/02/2018 08:57 AM    Bilirubin, direct 0.1 07/02/2018 08:57 AM    Bilirubin, total 0.6 07/02/2018 08:57 AM         Cholesterol  Lab Results   Component Value Date/Time    Cholesterol, total 210 (H) 07/02/2018 08:57 AM    HDL Cholesterol 53 07/02/2018 08:57 AM    LDL, calculated 125.2 (H) 07/02/2018 08:57 AM    Triglyceride 159 (H) 07/02/2018 08:57 AM    CHOL/HDL Ratio 4.0 07/02/2018 08:57 AM

## 2019-03-14 ENCOUNTER — IMPORTED ENCOUNTER (OUTPATIENT)
Dept: URBAN - METROPOLITAN AREA CLINIC 1 | Facility: CLINIC | Age: 84
End: 2019-03-14

## 2019-03-14 PROBLEM — H40.1121: Noted: 2019-03-14

## 2019-03-14 PROBLEM — H40.1112: Noted: 2019-03-14

## 2019-03-14 PROBLEM — H26.493: Noted: 2019-03-14

## 2019-03-14 PROCEDURE — 92083 EXTENDED VISUAL FIELD XM: CPT

## 2019-03-14 PROCEDURE — 92014 COMPRE OPH EXAM EST PT 1/>: CPT

## 2019-03-14 NOTE — PATIENT DISCUSSION
1. COAG OU (Mod OD Mild OS) (0.85/0. 8)- VF shows no progression OU IOP Stable on current gtts. Continue Cosopt BID OU and Latanoprost OU QHS. Patient advised to be compliant with gtts. 2.  PCO OU: (Posterior Capsule Opacification)   Observe 3. Pseudophakia OU 4. GR I Hypertensive Retinopathy OU - stable HTN Control. 5.  PETER w/ PEK OU- Cont BID OU Routinely. 6.  Macular Drusen OU - stable observe. Letter to PCP Return for an appointment in 6 mo 10 OCT with Dr. Jitendra Guerra.

## 2019-05-10 DIAGNOSIS — I10 ESSENTIAL HYPERTENSION: ICD-10-CM

## 2019-05-10 RX ORDER — AMLODIPINE BESYLATE 2.5 MG/1
TABLET ORAL
Qty: 90 TAB | Refills: 0 | Status: SHIPPED | OUTPATIENT
Start: 2019-05-10 | End: 2019-08-12 | Stop reason: SDUPTHER

## 2019-06-03 RX ORDER — SERTRALINE HYDROCHLORIDE 25 MG/1
TABLET, FILM COATED ORAL
Qty: 90 TAB | Refills: 1 | Status: SHIPPED | OUTPATIENT
Start: 2019-06-03 | End: 2019-12-02 | Stop reason: SDUPTHER

## 2019-07-11 ENCOUNTER — OFFICE VISIT (OUTPATIENT)
Dept: FAMILY MEDICINE CLINIC | Age: 84
End: 2019-07-11

## 2019-07-11 ENCOUNTER — HOSPITAL ENCOUNTER (OUTPATIENT)
Dept: LAB | Age: 84
Discharge: HOME | End: 2019-07-11
Payer: MEDICARE

## 2019-07-11 VITALS
OXYGEN SATURATION: 97 % | HEART RATE: 51 BPM | RESPIRATION RATE: 16 BRPM | BODY MASS INDEX: 20.98 KG/M2 | WEIGHT: 114 LBS | DIASTOLIC BLOOD PRESSURE: 60 MMHG | TEMPERATURE: 96.5 F | SYSTOLIC BLOOD PRESSURE: 146 MMHG | HEIGHT: 62 IN

## 2019-07-11 DIAGNOSIS — I10 ESSENTIAL HYPERTENSION: ICD-10-CM

## 2019-07-11 DIAGNOSIS — Z00.00 MEDICARE ANNUAL WELLNESS VISIT, SUBSEQUENT: ICD-10-CM

## 2019-07-11 DIAGNOSIS — I48.0 PAROXYSMAL ATRIAL FIBRILLATION (HCC): ICD-10-CM

## 2019-07-11 DIAGNOSIS — I10 ESSENTIAL HYPERTENSION: Primary | ICD-10-CM

## 2019-07-11 DIAGNOSIS — E78.00 PURE HYPERCHOLESTEROLEMIA: ICD-10-CM

## 2019-07-11 LAB
ALBUMIN SERPL-MCNC: 3.5 G/DL (ref 3.4–5)
ALBUMIN/GLOB SERPL: 1 {RATIO} (ref 0.8–1.7)
ALP SERPL-CCNC: 82 U/L (ref 45–117)
ALT SERPL-CCNC: 18 U/L (ref 13–56)
ANION GAP SERPL CALC-SCNC: 5 MMOL/L (ref 3–18)
APPEARANCE UR: CLEAR
AST SERPL-CCNC: 15 U/L (ref 15–37)
BACTERIA URNS QL MICRO: ABNORMAL /HPF
BASOPHILS # BLD: 0 K/UL (ref 0–0.1)
BASOPHILS NFR BLD: 0 % (ref 0–2)
BILIRUB DIRECT SERPL-MCNC: 0.1 MG/DL (ref 0–0.2)
BILIRUB SERPL-MCNC: 0.4 MG/DL (ref 0.2–1)
BILIRUB UR QL: NEGATIVE
BUN SERPL-MCNC: 29 MG/DL (ref 7–18)
BUN/CREAT SERPL: 21 (ref 12–20)
CALCIUM SERPL-MCNC: 9 MG/DL (ref 8.5–10.1)
CHLORIDE SERPL-SCNC: 106 MMOL/L (ref 100–108)
CHOLEST SERPL-MCNC: 206 MG/DL
CO2 SERPL-SCNC: 29 MMOL/L (ref 21–32)
COLOR UR: YELLOW
CREAT SERPL-MCNC: 1.4 MG/DL (ref 0.6–1.3)
DIFFERENTIAL METHOD BLD: ABNORMAL
EOSINOPHIL # BLD: 0.3 K/UL (ref 0–0.4)
EOSINOPHIL NFR BLD: 2 % (ref 0–5)
EPITH CASTS URNS QL MICRO: ABNORMAL /LPF (ref 0–5)
ERYTHROCYTE [DISTWIDTH] IN BLOOD BY AUTOMATED COUNT: 14.2 % (ref 11.6–14.5)
GLOBULIN SER CALC-MCNC: 3.6 G/DL (ref 2–4)
GLUCOSE SERPL-MCNC: 101 MG/DL (ref 74–99)
GLUCOSE UR STRIP.AUTO-MCNC: NEGATIVE MG/DL
HCT VFR BLD AUTO: 43.6 % (ref 35–45)
HDLC SERPL-MCNC: 44 MG/DL (ref 40–60)
HDLC SERPL: 4.7 {RATIO} (ref 0–5)
HGB BLD-MCNC: 14 G/DL (ref 12–16)
HGB UR QL STRIP: NEGATIVE
KETONES UR QL STRIP.AUTO: NEGATIVE MG/DL
LDLC SERPL CALC-MCNC: 117.6 MG/DL (ref 0–100)
LEUKOCYTE ESTERASE UR QL STRIP.AUTO: ABNORMAL
LIPID PROFILE,FLP: ABNORMAL
LYMPHOCYTES # BLD: 2.5 K/UL (ref 0.9–3.6)
LYMPHOCYTES NFR BLD: 20 % (ref 21–52)
MCH RBC QN AUTO: 29.4 PG (ref 24–34)
MCHC RBC AUTO-ENTMCNC: 32.1 G/DL (ref 31–37)
MCV RBC AUTO: 91.4 FL (ref 74–97)
MONOCYTES # BLD: 1 K/UL (ref 0.05–1.2)
MONOCYTES NFR BLD: 8 % (ref 3–10)
NEUTS SEG # BLD: 8.7 K/UL (ref 1.8–8)
NEUTS SEG NFR BLD: 70 % (ref 40–73)
NITRITE UR QL STRIP.AUTO: NEGATIVE
PH UR STRIP: 7.5 [PH] (ref 5–8)
PLATELET # BLD AUTO: 248 K/UL (ref 135–420)
PMV BLD AUTO: 11.2 FL (ref 9.2–11.8)
POTASSIUM SERPL-SCNC: 4.6 MMOL/L (ref 3.5–5.5)
PROT SERPL-MCNC: 7.1 G/DL (ref 6.4–8.2)
PROT UR STRIP-MCNC: NEGATIVE MG/DL
RBC # BLD AUTO: 4.77 M/UL (ref 4.2–5.3)
RBC #/AREA URNS HPF: 0 /HPF (ref 0–5)
SODIUM SERPL-SCNC: 140 MMOL/L (ref 136–145)
SP GR UR REFRACTOMETRY: 1.01 (ref 1–1.03)
T4 FREE SERPL-MCNC: 0.9 NG/DL (ref 0.7–1.5)
TRIGL SERPL-MCNC: 222 MG/DL (ref ?–150)
TSH SERPL DL<=0.05 MIU/L-ACNC: 3.48 UIU/ML (ref 0.36–3.74)
UROBILINOGEN UR QL STRIP.AUTO: 0.2 EU/DL (ref 0.2–1)
VLDLC SERPL CALC-MCNC: 44.4 MG/DL
WBC # BLD AUTO: 12.4 K/UL (ref 4.6–13.2)
WBC URNS QL MICRO: ABNORMAL /HPF (ref 0–4)

## 2019-07-11 PROCEDURE — 81001 URINALYSIS AUTO W/SCOPE: CPT

## 2019-07-11 PROCEDURE — 36415 COLL VENOUS BLD VENIPUNCTURE: CPT

## 2019-07-11 PROCEDURE — 80076 HEPATIC FUNCTION PANEL: CPT

## 2019-07-11 PROCEDURE — 85025 COMPLETE CBC W/AUTO DIFF WBC: CPT

## 2019-07-11 PROCEDURE — 84439 ASSAY OF FREE THYROXINE: CPT

## 2019-07-11 PROCEDURE — 80061 LIPID PANEL: CPT

## 2019-07-11 PROCEDURE — 84443 ASSAY THYROID STIM HORMONE: CPT

## 2019-07-11 PROCEDURE — 80048 BASIC METABOLIC PNL TOTAL CA: CPT

## 2019-07-11 RX ORDER — NITROFURANTOIN 25; 75 MG/1; MG/1
100 CAPSULE ORAL 2 TIMES DAILY
Qty: 10 CAP | Refills: 0 | Status: SHIPPED | OUTPATIENT
Start: 2019-07-11 | End: 2019-07-16

## 2019-07-11 NOTE — PROGRESS NOTES
This is the Subsequent Medicare Annual Wellness Exam, performed 12 months or more after the Initial AWV or the last Subsequent AWV    I have reviewed the patient's medical history in detail and updated the computerized patient record. 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  losartan (COZAAR) 100 mg tablet TAKE ONE TABLET BY MOUTH ONCE DAILY 90 Tab 2    Cholecalciferol, Vitamin D3, 1,000 unit cap Take  by mouth daily.  aspirin delayed-release 81 mg tablet Take  by mouth daily.  omega-3 fatty acids-vitamin e (FISH OIL) 1,000 mg Cap Take 1 Cap by mouth.  calcium-cholecalciferol, D3, (CALCIUM 600 + D) tablet Take 1 Tab by mouth daily.  latanoprost (XALATAN) 0.005 % ophthalmic solution Administer 1 Drop to both eyes nightly.  timolol (TIMOPTIC) 0.25 % ophthalmic solution Administer 1 Drop to both eyes two (2) times a day. 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 Functional Ability and Level of Safety:   Hearing Loss  Hearing is good. Activities of Daily Living  The home contains: no safety equipment. Patient does total self care    Fall Risk  Fall Risk Assessment, last 12 mths 7/11/2019   Able to walk? Yes   Fall in past 12 months? No   Fall with injury? -   Number of falls in past 12 months -       Abuse Screen  Patient is not abused    Cognitive Screening   Evaluation of Cognitive Function:  Has your family/caregiver stated any concerns about your memory: no  Normal    Patient Care Team   Patient Care Team:  Lizzy Raman MD as PCP - Carlos Quintanilla MD (Cardiology)  Estela Mauricio RN as Ambulatory Care Navigator  Aline Boss LPN as Ambulatory Care Navigator    Assessment/Plan   Education and counseling provided:  Are appropriate based on today's review and evaluation    Diagnoses and all orders for this visit:    1. Medicare annual wellness visit, subsequent    2. Essential hypertension    3. Pure hypercholesterolemia    4.  Paroxysmal atrial fibrillation [facility])        Health Maintenance Due   Topic Date Due    Shingrix Vaccine Age 49> (1 of 2) 01/31/1976    MEDICARE YEARLY EXAM  07/12/2019

## 2019-07-11 NOTE — PROGRESS NOTES
Assessment/Plan:    *Diagnoses and all orders for this visit:    1. Essential hypertension    2. Medicare annual wellness visit, subsequent    3. Pure hypercholesterolemia    4. Paroxysmal atrial fibrillation (HCC)        Labs today. F/u 6 months. The plan was discussed with the patient. The patient verbalized understanding and is in agreement with the plan. All medication potential side effects were discussed with the patient.    -------------------------------------------------------------------------------------------------------------------        Antonia Melchor is a 80 y.o. female and presents with Annual Wellness Visit         Subjective:  Pt here for 6 mon f/u. Has been doing very well. We spoke about how we feel she should be using a cane from on when she is out, to offer her more stability. HTN:  Well controlled. HLD: has been stable, will repeat today. parox A Fib: stable, in sinus. Not on any blood thinners anymore, stopped by cardiology. ROS:  Review of Systems - Negative         The problem list was updated as a part of today's visit. 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         SH:  Social History     Tobacco Use    Smoking status: Never Smoker    Smokeless tobacco: Never Used   Substance Use Topics    Alcohol use: No    Drug use: No       Medications/Allergies:  Current Outpatient Medications on File Prior to Visit   Medication Sig Dispense Refill    sertraline (ZOLOFT) 25 mg tablet TAKE 1 TABLET BY MOUTH ONCE DAILY 90 Tab 1    amLODIPine (NORVASC) 2.5 mg tablet TAKE 1 TABLET BY MOUTH ONCE DAILY 90 Tab 0    sotalol (BETAPACE) 80 mg tablet Take 1 Tab by mouth two (2) times a day.  losartan (COZAAR) 100 mg tablet TAKE ONE TABLET BY MOUTH ONCE DAILY 90 Tab 2    Cholecalciferol, Vitamin D3, 1,000 unit cap Take  by mouth daily.  aspirin delayed-release 81 mg tablet Take  by mouth daily.  omega-3 fatty acids-vitamin e (FISH OIL) 1,000 mg Cap Take 1 Cap by mouth.  calcium-cholecalciferol, D3, (CALCIUM 600 + D) tablet Take 1 Tab by mouth daily.  latanoprost (XALATAN) 0.005 % ophthalmic solution Administer 1 Drop to both eyes nightly.  timolol (TIMOPTIC) 0.25 % ophthalmic solution Administer 1 Drop to both eyes two (2) times a day.  methocarbamol (ROBAXIN) 500 mg tablet        No current facility-administered medications on file prior to visit. No Known Allergies      Health Maintenance:   Health Maintenance   Topic Date Due    Shingrix Vaccine Age 49> (1 of 2) 01/31/1976    MEDICARE YEARLY EXAM  07/12/2019    Influenza Age 5 to Adult  08/01/2019    GLAUCOMA SCREENING Q2Y  03/14/2021    DTaP/Tdap/Td series (2 - Td) 07/19/2027    Bone Densitometry (Dexa) Screening  Completed    Pneumococcal 65+ years  Completed       Objective:  Visit Vitals  /60 (BP 1 Location: Left arm, BP Patient Position: Sitting)   Pulse (!) 51   Temp 96.5 °F (35.8 °C) (Oral)   Resp 16   Ht 5' 2\" (1.575 m)   Wt 114 lb (51.7 kg)   SpO2 97%   BMI 20.85 kg/m²          Nurses notes and VS reviewed.       Physical Examination: General appearance - alert, well appearing, and in no distress  Ears - bilateral TM's and external ear canals normal  Mouth - mucous membranes moist, pharynx normal without lesions  Neck - supple, no significant adenopathy  Chest - clear to auscultation, no wheezes, rales or rhonchi, symmetric air entry  Heart - normal rate, regular rhythm, normal S1, S2, no murmurs, rubs, clicks or gallops  Abdomen - soft, nontender, nondistended, no masses or organomegaly  Musculoskeletal - no joint tenderness, deformity or swelling  Extremities - no pedal edema noted          Labwork and Ancillary Studies:    CBC w/Diff  Lab Results   Component Value Date/Time    WBC 10.9 07/02/2018 08:57 AM    HGB 13.6 07/02/2018 08:57 AM    PLATELET 571 94/16/7730 08:57 AM         Basic Metabolic Profile  Lab Results   Component Value Date/Time    Sodium 139 07/02/2018 08:57 AM    Potassium 4.5 07/02/2018 08:57 AM    Chloride 105 07/02/2018 08:57 AM    CO2 27 07/02/2018 08:57 AM    Anion gap 7 07/02/2018 08:57 AM    Glucose 124 (H) 07/02/2018 08:57 AM    BUN 23 (H) 07/02/2018 08:57 AM    Creatinine 1.35 (H) 07/02/2018 08:57 AM    BUN/Creatinine ratio 17 07/02/2018 08:57 AM    GFR est AA 44 (L) 07/02/2018 08:57 AM    GFR est non-AA 37 (L) 07/02/2018 08:57 AM    Calcium 8.6 07/02/2018 08:57 AM         LFT  Lab Results   Component Value Date/Time    ALT (SGPT) 18 07/02/2018 08:57 AM    AST (SGOT) 18 07/02/2018 08:57 AM    Alk.  phosphatase 65 07/02/2018 08:57 AM    Bilirubin, direct 0.1 07/02/2018 08:57 AM    Bilirubin, total 0.6 07/02/2018 08:57 AM         Cholesterol  Lab Results   Component Value Date/Time    Cholesterol, total 210 (H) 07/02/2018 08:57 AM    HDL Cholesterol 53 07/02/2018 08:57 AM    LDL, calculated 125.2 (H) 07/02/2018 08:57 AM    Triglyceride 159 (H) 07/02/2018 08:57 AM    CHOL/HDL Ratio 4.0 07/02/2018 08:57 AM

## 2019-07-11 NOTE — PROGRESS NOTES
Lance Giron is a 80 y.o. female (: 1926) presenting to address:    Chief Complaint   Patient presents with    Annual Wellness Visit       Vitals:    19 1110   BP: 146/60   Pulse: (!) 51   Resp: 16   Temp: 96.5 °F (35.8 °C)   TempSrc: Oral   SpO2: 97%   Weight: 114 lb (51.7 kg)   Height: 5' 2\" (1.575 m)   PainSc:   0 - No pain       Hearing/Vision:      Visual Acuity Screening    Right eye Left eye Both eyes   Without correction:      With correction: 20/40 20/40 20/40       Learning Assessment:     Learning Assessment 2016   PRIMARY LEARNER Patient   HIGHEST LEVEL OF EDUCATION - PRIMARY LEARNER  GRADUATED HIGH SCHOOL OR GED   BARRIERS PRIMARY LEARNER NONE   CO-LEARNER CAREGIVER No   PRIMARY LANGUAGE ENGLISH   LEARNER PREFERENCE PRIMARY DEMONSTRATION   ANSWERED BY self   RELATIONSHIP SELF     Depression Screening:     3 most recent PHQ Screens 2019   Little interest or pleasure in doing things Not at all   Feeling down, depressed, irritable, or hopeless Not at all   Total Score PHQ 2 0     Fall Risk Assessment:     Fall Risk Assessment, last 12 mths 2019   Able to walk? Yes   Fall in past 12 months? No   Fall with injury? -   Number of falls in past 12 months -     Abuse Screening:     Abuse Screening Questionnaire 2019   Do you ever feel afraid of your partner? N   Are you in a relationship with someone who physically or mentally threatens you? N   Is it safe for you to go home? Y     Coordination of Care Questionaire:   1. Have you been to the ER, urgent care clinic since your last visit? Hospitalized since your last visit? NO    2. Have you seen or consulted any other health care providers outside of the [address] [place] [address] since your last visit? Include any pap smears or colon screening. NO    Advanced Directive:   1. Do you have an Advanced Directive? yes    2. Would you like information on Advanced Directives?  NO

## 2019-07-11 NOTE — PATIENT INSTRUCTIONS
Medicare Wellness Visit, Female The best way to live healthy is to have a lifestyle where you eat a well-balanced diet, exercise regularly, limit alcohol use, and quit all forms of tobacco/nicotine, if applicable. Regular preventive services are another way to keep healthy. Preventive services (vaccines, screening tests, monitoring & exams) can help personalize your care plan, which helps you manage your own care. Screening tests can find health problems at the earliest stages, when they are easiest to treat. [facility] follows the current, evidence-based guidelines published by the [facility] ([facility]) when recommending preventive services for our patients. Because we follow these guidelines, sometimes recommendations change over time as research supports it. (For example, mammograms used to be recommended annually. Even though Medicare will still pay for an annual mammogram, the newer guidelines recommend a mammogram every two years for women of average risk.) Of course, you and your doctor may decide to screen more often for some diseases, based on your risk and your health status. Preventive services for you include: - Medicare offers their members a free annual wellness visit, which is time for you and your primary care provider to discuss and plan for your preventive service needs. Take advantage of this benefit every year! 
-All adults over the age of 72 should receive the recommended pneumonia vaccines. Current USPSTF guidelines recommend a series of two vaccines for the best pneumonia protection.  
-All adults should have a flu vaccine yearly and a tetanus vaccine every 10 years. All adults age 61 and older should receive a shingles vaccine once in their lifetime.   
-A bone mass density test is recommended when a woman turns 65 to screen for osteoporosis. This test is only recommended one time, as a screening. Some providers will use this same test as a disease monitoring tool if you already have osteoporosis. -All adults age 38-68 who are overweight should have a diabetes screening test once every three years.  
-Other screening tests and preventive services for persons with diabetes include: an eye exam to screen for diabetic retinopathy, a kidney function test, a foot exam, and stricter control over your cholesterol.  
-Cardiovascular screening for adults with routine risk involves an electrocardiogram (ECG) at intervals determined by your doctor.  
-Colorectal cancer screenings should be done for adults age 54-65 with no increased risk factors for colorectal cancer. There are a number of acceptable methods of screening for this type of cancer. Each test has its own benefits and drawbacks. Discuss with your doctor what is most appropriate for you during your annual wellness visit. The different tests include: colonoscopy (considered the best screening method), a fecal occult blood test, a fecal DNA test, and sigmoidoscopy. -Breast cancer screenings are recommended every other year for women of normal risk, age 54-69. 
-Cervical cancer screenings for women over age 72 are only recommended with certain risk factors.  
-All adults born between [facility] should be screened once for Hepatitis C. Here is a list of your current Health Maintenance items (your personalized list of preventive services) with a due date: 
Health Maintenance Due Topic Date Due  Shingles Vaccine (1 of 2) 01[birth year]31/1976 [facility] Annual Well Visit  07/12/2019

## 2019-07-12 NOTE — PROGRESS NOTES
Has some bacteria in urine. Will send in am Abx for her. Cholesterol is stable. Her kidney levels are a little higher. She needs to increase water intake.

## 2019-09-26 ENCOUNTER — IMPORTED ENCOUNTER (OUTPATIENT)
Dept: URBAN - METROPOLITAN AREA CLINIC 1 | Facility: CLINIC | Age: 84
End: 2019-09-26

## 2019-09-26 PROBLEM — H40.1112: Noted: 2019-09-26

## 2019-09-26 PROBLEM — H01.001: Noted: 2019-09-26

## 2019-09-26 PROBLEM — H01.004: Noted: 2019-09-26

## 2019-09-26 PROBLEM — H40.1121: Noted: 2019-09-26

## 2019-09-26 PROCEDURE — 92012 INTRM OPH EXAM EST PATIENT: CPT

## 2019-09-26 PROCEDURE — 92133 CPTRZD OPH DX IMG PST SGM ON: CPT

## 2019-09-26 NOTE — PATIENT DISCUSSION
1. COAG OU (Mod OD Mild OS) (0.85/0.8)- OCT shows no progression OU IOP Stable on current gtts. Continue Cosopt BID OU and Latanoprost OU QHS. Patient advised to be compliant with gtts. 2.  PCO OU: (Posterior Capsule Opacification)   Observe 3. Pseudophakia OU 4. GR I Hypertensive Retinopathy OU - stable HTN Control. 5.  PETER w/ PEK OU- Cont BID OU Routinely. 6.  Macular Drusen OU - stable observe. 7.  Anterior Blepharitis OU - Begin Daily Hot compresses and lid scrubs were recommended. Return for an appointment in 6 mo 30 VF 24-2 OU with Dr. Hunter Fish.

## 2020-01-15 ENCOUNTER — OFFICE VISIT (OUTPATIENT)
Dept: FAMILY MEDICINE CLINIC | Age: 85
End: 2020-01-15

## 2020-01-15 VITALS
TEMPERATURE: 96.5 F | WEIGHT: 118 LBS | DIASTOLIC BLOOD PRESSURE: 58 MMHG | BODY MASS INDEX: 21.71 KG/M2 | HEIGHT: 62 IN | SYSTOLIC BLOOD PRESSURE: 142 MMHG | RESPIRATION RATE: 16 BRPM | OXYGEN SATURATION: 96 % | HEART RATE: 56 BPM

## 2020-01-15 DIAGNOSIS — I48.0 PAROXYSMAL ATRIAL FIBRILLATION (HCC): ICD-10-CM

## 2020-01-15 DIAGNOSIS — I10 ESSENTIAL HYPERTENSION: Primary | ICD-10-CM

## 2020-01-15 RX ORDER — AMLODIPINE BESYLATE 2.5 MG/1
TABLET ORAL
Qty: 90 TAB | Refills: 1 | Status: SHIPPED | OUTPATIENT
Start: 2020-01-15 | End: 2020-08-06

## 2020-01-15 RX ORDER — DORZOLAMIDE HCL 20 MG/ML
2 SOLUTION/ DROPS OPHTHALMIC 3 TIMES DAILY
COMMUNITY

## 2020-01-15 NOTE — PROGRESS NOTES
Assessment/Plan:    *Diagnoses and all orders for this visit:    1. Essential hypertension  -     amLODIPine (NORVASC) 2.5 mg tablet; TAKE 1 TABLET BY MOUTH ONCE DAILY    2. Paroxysmal atrial fibrillation ([address]        Will return for PE in July. Labs same day. The plan was discussed with the patient. The patient verbalized understanding and is in agreement with the plan. All medication potential side effects were discussed with the patient.    -------------------------------------------------------------------------------------------------------------------        Cecy Lima is a 80 y.o. female and presents with Hypertension         Subjective:  Pt here for 6 month f/u. HTN:  Taking her meds regularly, tolerates them well. AF:  Paroxysmal and stable. Managed by her cardiologist.        Review of Systems - General ROS: negative         The problem list was updated as a part of today's visit. 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 SH:  Social History     Tobacco Use    Smoking status: Never Smoker    Smokeless tobacco: Never Used   Substance Use Topics    Alcohol use: No    Drug use: No       Medications/Allergies:  Current Outpatient Medications on File Prior to Visit   Medication Sig Dispense Refill    dorzolamide (TRUSOPT) 2 % ophthalmic solution Administer 2 Drops to both eyes three (3) times daily.  tetrahydrozoline HCl (VISINE OP) Apply  to eye daily.  losartan (COZAAR) 100 mg tablet TAKE 1 TABLET BY MOUTH ONCE DAILY 90 Tab 2    sotalol (BETAPACE) 80 mg tablet Take 1 Tab by mouth two (2) times a day.       Cholecalciferol, Vitamin D3, 1,000 unit cap Take  by mouth daily.  aspirin delayed-release 81 mg tablet Take  by mouth daily.  omega-3 fatty acids-vitamin e (FISH OIL) 1,000 mg Cap Take 1 Cap by mouth.  calcium-cholecalciferol, D3, (CALCIUM 600 + D) tablet Take 1 Tab by mouth daily.  latanoprost (XALATAN) 0.005 % ophthalmic solution Administer 1 Drop to both eyes nightly.  timolol (TIMOPTIC) 0.25 % ophthalmic solution Administer 1 Drop to both eyes two (2) times a day.  sertraline (ZOLOFT) 25 mg tablet TAKE 1 TABLET BY MOUTH ONCE DAILY 90 Tab 1    [DISCONTINUED] amLODIPine (NORVASC) 2.5 mg tablet TAKE 1 TABLET BY MOUTH ONCE DAILY 90 Tab 1     No current facility-administered medications on file prior to visit. No Known Allergies      Health Maintenance:   Health Maintenance   Topic Date Due    Shingrix Vaccine Age 49> (1 of 2) 01/31/1976    Influenza Age 5 to Adult  08/01/2019    MEDICARE YEARLY EXAM  07/11/2020    GLAUCOMA SCREENING Q2Y  03/14/2021    DTaP/Tdap/Td series (2 - Td) 07/19/2027    Bone Densitometry (Dexa) Screening  Completed    Pneumococcal 65+ years  Completed       Objective:  Visit Vitals  /58 (BP 1 Location: Left arm, BP Patient Position: Sitting) Comment (BP Patient Position): manual   Pulse (!) 56   Temp 96.5 °F (35.8 °C) (Oral)   Resp 16   Ht 5' 2\" (1.575 m)   Wt 118 lb (53.5 kg)   SpO2 96%   BMI 21.58 kg/m²          Nurses notes and VS reviewed.       Physical Examination: General appearance - alert, well appearing, and in no distress  Chest - clear to auscultation, no wheezes, rales or rhonchi, symmetric air entry  Heart - normal rate, regular rhythm, normal S1, S2, no murmurs, rubs, clicks or gallops  Extremities - peripheral pulses normal, no pedal edema, no clubbing or cyanosis          Labwork and Ancillary Studies:    CBC w/Diff  Lab Results   Component Value Date/Time    WBC 12.4 07/11/2019 12:03 PM    HGB 14.0 07/11/2019 12:03 PM    PLATELET 040 07/11/2019 12:03 PM         Basic Metabolic Profile  Lab Results   Component Value Date/Time    Sodium 140 07/11/2019 12:03 PM    Potassium 4.6 07/11/2019 12:03 PM    Chloride 106 07/11/2019 12:03 PM    CO2 29 07/11/2019 12:03 PM    Anion gap 5 07/11/2019 12:03 PM    Glucose 101 (H) 07/11/2019 12:03 PM    BUN 29 (H) 07/11/2019 12:03 PM    Creatinine 1.40 (H) 07/11/2019 12:03 PM    BUN/Creatinine ratio 21 (H) 07/11/2019 12:03 PM    GFR est AA 43 (L) 07/11/2019 12:03 PM    GFR est non-AA 35 (L) 07/11/2019 12:03 PM    Calcium 9.0 07/11/2019 12:03 PM         LFT  Lab Results   Component Value Date/Time    ALT (SGPT) 18 07/11/2019 12:03 PM    AST (SGOT) 15 07/11/2019 12:03 PM    Alk.  phosphatase 82 07/11/2019 12:03 PM    Bilirubin, direct 0.1 07/11/2019 12:03 PM    Bilirubin, total 0.4 07/11/2019 12:03 PM         Cholesterol  Lab Results   Component Value Date/Time    Cholesterol, total 206 (H) 07/11/2019 12:03 PM    HDL Cholesterol 44 07/11/2019 12:03 PM    LDL, calculated 117.6 (H) 07/11/2019 12:03 PM    Triglyceride 222 (H) 07/11/2019 12:03 PM    CHOL/HDL Ratio 4.7 07/11/2019 12:03 PM

## 2020-01-15 NOTE — PROGRESS NOTES
Audi Maravilla is a 80 y.o. female (: 1926) presenting to address:    Chief Complaint   Patient presents with    Hypertension       Vitals:    01/15/20 1313   BP: 158/60   Pulse: (!) 56   Resp: 16   Temp: 96.5 °F (35.8 °C)   TempSrc: Oral   SpO2: 96%   Weight: 118 lb (53.5 kg)   Height: 5' 2\" (1.575 m)   PainSc:   0 - No pain       Hearing/Vision:   No exam data present    Learning Assessment:     Learning Assessment 2016   PRIMARY LEARNER Patient   HIGHEST LEVEL OF EDUCATION - PRIMARY LEARNER  GRADUATED HIGH SCHOOL OR GED   BARRIERS PRIMARY LEARNER NONE   CO-LEARNER CAREGIVER No   PRIMARY LANGUAGE ENGLISH   LEARNER PREFERENCE PRIMARY DEMONSTRATION   ANSWERED BY self   RELATIONSHIP SELF     Depression Screening:     3 most recent PHQ Screens 1/15/2020   Little interest or pleasure in doing things Not at all   Feeling down, depressed, irritable, or hopeless Not at all   Total Score PHQ 2 0     Fall Risk Assessment:     Fall Risk Assessment, last 12 mths 2019   Able to walk? Yes   Fall in past 12 months? No   Fall with injury? -   Number of falls in past 12 months -     Abuse Screening:     Abuse Screening Questionnaire 2019   Do you ever feel afraid of your partner? N   Are you in a relationship with someone who physically or mentally threatens you? N   Is it safe for you to go home? Y     Coordination of Care Questionaire:   1. Have you been to the ER, urgent care clinic since your last visit? Hospitalized since your last visit? NO    2. Have you seen or consulted any other health care providers outside of the [address] [place] [address] since your last visit? Include any pap smears or colon screening. NO    Advanced Directive:   1. Do you have an Advanced Directive? NO    2. Would you like information on Advanced Directives?  NO

## 2020-02-12 ENCOUNTER — TELEPHONE (OUTPATIENT)
Dept: FAMILY MEDICINE CLINIC | Age: 85
End: 2020-02-12

## 2020-02-12 NOTE — TELEPHONE ENCOUNTER
Patient's daughter called requesting to speak with the nurse about her mom. She states that her mom has had a really bad head cold for 2 weeks and was wanting to know what  would recommend to take over the counter.  The daughter states that if the nure can't reach her on her mobile then the nurse can call her at [phone]

## 2020-02-13 NOTE — TELEPHONE ENCOUNTER
Returned daughter call. Per Dr. Annabel Mae she can take regular Mucinex or Coricidan and come in for an appt tomorrow. Daughter will  med. Pt is adamant she doesn't want to come in for appt and she is feeling a bit better. Asked that she take pt to urgent care over the weekend if symptoms worsen.

## 2020-02-13 NOTE — TELEPHONE ENCOUNTER
Spoke with daughter Jacob Pierre. Pt has had head and chest congestion for 2 weeks, productive cough, denies fever. Pt is weaker per daughter, only using cough drops. Daughter is asking if there is a med she should give her.  No appts available in office today

## 2020-05-19 RX ORDER — SERTRALINE HYDROCHLORIDE 25 MG/1
TABLET, FILM COATED ORAL
Qty: 90 TAB | Refills: 0 | Status: SHIPPED | OUTPATIENT
Start: 2020-05-19 | End: 2020-08-27

## 2020-07-01 DIAGNOSIS — E78.00 PURE HYPERCHOLESTEROLEMIA: Primary | ICD-10-CM

## 2020-07-01 DIAGNOSIS — E55.9 HYPOVITAMINOSIS D: ICD-10-CM

## 2020-07-02 NOTE — TELEPHONE ENCOUNTER
This is a late entry. My nurse received a phone message on 1/25/17 from [facility], the pharmacy manager over at [place] ([address] [facility]). She requested that we give her a call back in reference to Mrs. Pierre. My nurse, Tressa Casiano, called back the next day and Boy Keyes (a different pharmacist) was on duty. They had picked up, after the fact, that the patient had received three prevnar 13 vaccines (8/13/2015, 2/1/2016 and most recently within the last week). This is the patient's regular pharmacy and she always goes here. I spoke with Boy Keyes after that and apparently, they do not routinely look up patient's records in the pharmacy before giving a vaccine. When I asked why, she basically said that it is just not what they do. They expect that the doctor should know what the patient has been given in the past.  However, the problem with this is that pharmacies typically never send us the vaccine notification when vaccines are given so physicians are not informed. Boy Keyes could not give any further explanation but did say the patient was called already and informed about what had happened.
No

## 2020-07-09 ENCOUNTER — HOSPITAL ENCOUNTER (OUTPATIENT)
Dept: LAB | Age: 85
Discharge: HOME | End: 2020-07-09
Payer: MEDICARE

## 2020-07-09 DIAGNOSIS — E78.00 PURE HYPERCHOLESTEROLEMIA: ICD-10-CM

## 2020-07-09 DIAGNOSIS — E55.9 HYPOVITAMINOSIS D: ICD-10-CM

## 2020-07-09 LAB
25(OH)D3 SERPL-MCNC: 54.9 NG/ML (ref 30–100)
ALBUMIN SERPL-MCNC: 3.5 G/DL (ref 3.4–5)
ALBUMIN/GLOB SERPL: 1 {RATIO} (ref 0.8–1.7)
ALP SERPL-CCNC: 66 U/L (ref 45–117)
ALT SERPL-CCNC: 22 U/L (ref 13–56)
ANION GAP SERPL CALC-SCNC: 7 MMOL/L (ref 3–18)
AST SERPL-CCNC: 21 U/L (ref 10–38)
BASOPHILS # BLD: 0 K/UL (ref 0–0.1)
BASOPHILS NFR BLD: 0 % (ref 0–2)
BILIRUB DIRECT SERPL-MCNC: 0.2 MG/DL (ref 0–0.2)
BILIRUB SERPL-MCNC: 0.7 MG/DL (ref 0.2–1)
BUN SERPL-MCNC: 28 MG/DL (ref 7–18)
BUN/CREAT SERPL: 19 (ref 12–20)
CALCIUM SERPL-MCNC: 9.2 MG/DL (ref 8.5–10.1)
CHLORIDE SERPL-SCNC: 106 MMOL/L (ref 100–111)
CHOLEST SERPL-MCNC: 207 MG/DL
CO2 SERPL-SCNC: 25 MMOL/L (ref 21–32)
CREAT SERPL-MCNC: 1.46 MG/DL (ref 0.6–1.3)
DIFFERENTIAL METHOD BLD: ABNORMAL
EOSINOPHIL # BLD: 0.2 K/UL (ref 0–0.4)
EOSINOPHIL NFR BLD: 2 % (ref 0–5)
ERYTHROCYTE [DISTWIDTH] IN BLOOD BY AUTOMATED COUNT: 14.8 % (ref 11.6–14.5)
GLOBULIN SER CALC-MCNC: 3.6 G/DL (ref 2–4)
GLUCOSE SERPL-MCNC: 120 MG/DL (ref 74–99)
HCT VFR BLD AUTO: 46.9 % (ref 35–45)
HDLC SERPL-MCNC: 43 MG/DL (ref 40–60)
HDLC SERPL: 4.8 {RATIO} (ref 0–5)
HGB BLD-MCNC: 15.2 G/DL (ref 12–16)
LDLC SERPL CALC-MCNC: 121.2 MG/DL (ref 0–100)
LIPID PROFILE,FLP: ABNORMAL
LYMPHOCYTES # BLD: 2.6 K/UL (ref 0.9–3.6)
LYMPHOCYTES NFR BLD: 20 % (ref 21–52)
MCH RBC QN AUTO: 29 PG (ref 24–34)
MCHC RBC AUTO-ENTMCNC: 32.4 G/DL (ref 31–37)
MCV RBC AUTO: 89.5 FL (ref 74–97)
MONOCYTES # BLD: 0.9 K/UL (ref 0.05–1.2)
MONOCYTES NFR BLD: 7 % (ref 3–10)
NEUTS SEG # BLD: 9.2 K/UL (ref 1.8–8)
NEUTS SEG NFR BLD: 71 % (ref 40–73)
PLATELET # BLD AUTO: 254 K/UL (ref 135–420)
PMV BLD AUTO: 11.5 FL (ref 9.2–11.8)
POTASSIUM SERPL-SCNC: 5 MMOL/L (ref 3.5–5.5)
PROT SERPL-MCNC: 7.1 G/DL (ref 6.4–8.2)
RBC # BLD AUTO: 5.24 M/UL (ref 4.2–5.3)
SODIUM SERPL-SCNC: 138 MMOL/L (ref 136–145)
T4 FREE SERPL-MCNC: 1 NG/DL (ref 0.7–1.5)
TRIGL SERPL-MCNC: 214 MG/DL (ref ?–150)
TSH SERPL DL<=0.05 MIU/L-ACNC: 3.06 UIU/ML (ref 0.36–3.74)
VLDLC SERPL CALC-MCNC: 42.8 MG/DL
WBC # BLD AUTO: 12.9 K/UL (ref 4.6–13.2)

## 2020-07-09 PROCEDURE — 84443 ASSAY THYROID STIM HORMONE: CPT

## 2020-07-09 PROCEDURE — 80076 HEPATIC FUNCTION PANEL: CPT

## 2020-07-09 PROCEDURE — 85025 COMPLETE CBC W/AUTO DIFF WBC: CPT

## 2020-07-09 PROCEDURE — 84439 ASSAY OF FREE THYROXINE: CPT

## 2020-07-09 PROCEDURE — 82306 VITAMIN D 25 HYDROXY: CPT

## 2020-07-09 PROCEDURE — 80048 BASIC METABOLIC PNL TOTAL CA: CPT

## 2020-07-09 PROCEDURE — 80061 LIPID PANEL: CPT

## 2020-07-15 ENCOUNTER — VIRTUAL VISIT (OUTPATIENT)
Dept: FAMILY MEDICINE CLINIC | Age: 85
End: 2020-07-15

## 2020-07-15 DIAGNOSIS — Z00.00 MEDICARE ANNUAL WELLNESS VISIT, SUBSEQUENT: Primary | ICD-10-CM

## 2020-07-15 DIAGNOSIS — E78.00 PURE HYPERCHOLESTEROLEMIA: ICD-10-CM

## 2020-07-15 DIAGNOSIS — I48.0 PAROXYSMAL ATRIAL FIBRILLATION (HCC): ICD-10-CM

## 2020-07-15 DIAGNOSIS — I10 ESSENTIAL HYPERTENSION: ICD-10-CM

## 2020-07-15 NOTE — PROGRESS NOTES
This is the Subsequent Medicare Annual Wellness Exam, performed 12 months or more after the Initial AWV or the last Subsequent AWV    I have reviewed the patient's medical history in detail and updated the computerized patient record. 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 3/2/2013    Cataract 8/18/2011    Glaucoma     HH (hiatus hernia)     History of DVT (deep vein thrombosis) 3/2/2013    Hyperlipidemia     Hypertension     Insomnia     OA (osteoarthritis)     Osteoporosis     Peroneal DVT (deep venous thrombosis) (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  tetrahydrozoline HCl (VISINE OP) Apply  to eye daily.  amLODIPine (NORVASC) 2.5 mg tablet TAKE 1 TABLET BY MOUTH ONCE DAILY 90 Tab 1    losartan (COZAAR) 100 mg tablet TAKE 1 TABLET BY MOUTH ONCE DAILY 90 Tab 2    sotalol (BETAPACE) 80 mg tablet Take 1 Tab by mouth two (2) times a day.  Cholecalciferol, Vitamin D3, 1,000 unit cap Take  by mouth daily.  aspirin delayed-release 81 mg tablet Take  by mouth daily.  omega-3 fatty acids-vitamin e (FISH OIL) 1,000 mg Cap Take 1 Cap by mouth.       calcium-cholecalciferol, D3, (CALCIUM 600 + D) tablet Take 1 Tab by mouth daily.  latanoprost (XALATAN) 0.005 % ophthalmic solution Administer 1 Drop to both eyes nightly.  timolol (TIMOPTIC) 0.25 % ophthalmic solution Administer 1 Drop to both eyes two (2) times a day. No Known Allergies    Family History   Problem Relation Age of Onset    Diabetes Mother      Social History     Tobacco Use    Smoking status: Never Smoker    Smokeless tobacco: Never Used   Substance Use Topics    Alcohol use: No       Depression Risk Factor Screening:     3 most recent PHQ Screens 7/15/2020   Little interest or pleasure in doing things Not at all   Feeling down, depressed, irritable, or hopeless Not at all   Total Score PHQ 2 0       Alcohol Risk Factor Screening:   Do you average 1 drink per night or more than 7 drinks a week:  No    On any one occasion in the past three months have you have had more than 3 drinks containing alcohol:  No      Functional Ability and Level of Safety:   Hearing: Hearing is good. Activities of Daily Living: The home contains: no safety equipment. Patient does total self care     Ambulation: with no difficulty     Fall Risk:  Fall Risk Assessment, last 12 mths 7/15/2020   Able to walk? Yes   Fall in past 12 months? No   Fall with injury? -   Number of falls in past 12 months -     Abuse Screen:  Patient is not abused       Cognitive Screening   Has your family/caregiver stated any concerns about your memory: no        Patient Care Team   Patient Care Team:  Abdi Kaur MD as PCP - General  Abdi Kaur MD as PCP - [facility] Empaneled Provider  Ryan Mueller MD (Cardiology)    Assessment/Plan   Education and counseling provided:  Are appropriate based on today's review and evaluation    Diagnoses and all orders for this visit:    1. Medicare annual wellness visit, subsequent    2. Essential hypertension    3. Pure hypercholesterolemia    4.  Paroxysmal atrial fibrillation [facility])        Health Maintenance Due   Topic Date Due    Shingrix Vaccine Age 50> (1 of 2) 01/31/1976    Medicare Yearly Exam  07/11/2020       Josefina Brito, who was evaluated through a synchronous (real-time) audio only encounter, and/or her healthcare decision maker, is aware that it is a billable service, with coverage as determined by her insurance carrier. She provided verbal consent to proceed: Yes, and patient identification was verified. It was conducted pursuant to the emergency declaration under the [address] [place] [address], [address] [place] [address] authority and the [employer] Act. A caregiver was present when appropriate. Ability to conduct physical exam was limited. I was in the office. The patient was at home.     Yee Goodrich MD

## 2020-07-15 NOTE — PROGRESS NOTES
Toyin Hampton is a 80 y.o. female, evaluated via audio-only technology on 7/15/2020 for Annual Wellness Visit  . Assessment & Plan:   Diagnoses and all orders for this visit:    1. Medicare annual wellness visit, subsequent    2. Essential hypertension    3. Pure hypercholesterolemia    4. Paroxysmal atrial fibrillation (HCC)        F/u 6 months. Continue meds the same. 12  Subjective:       Pt was spoken to by phone. Has been doing very well through this Covid pandemic. HTN:  Says it is at goal.    HLD: we had to take her off Lipitor at one point but she has done well with managing levels. A Fib: stable. Prior to Admission medications    Medication Sig Start Date End Date Taking? Authorizing Provider   sertraline (ZOLOFT) 25 mg tablet Take 1 tablet by mouth once daily 5/19/20  Yes Dorinda Denise MD   dorzolamide (TRUSOPT) 2 % ophthalmic solution Administer 2 Drops to both eyes three (3) times daily. Yes Provider, Historical   tetrahydrozoline HCl (VISINE OP) Apply  to eye daily. Yes Provider, Historical   amLODIPine (NORVASC) 2.5 mg tablet TAKE 1 TABLET BY MOUTH ONCE DAILY 1/15/20  Yes Dorinda Denise MD   losartan (COZAAR) 100 mg tablet TAKE 1 TABLET BY MOUTH ONCE DAILY 12/2/19  Yes Dorinda Denise MD   sotalol (BETAPACE) 80 mg tablet Take 1 Tab by mouth two (2) times a day. 1/9/19  Yes Dorinda Denise MD   Cholecalciferol, Vitamin D3, 1,000 unit cap Take  by mouth daily. Yes Provider, Historical   aspirin delayed-release 81 mg tablet Take  by mouth daily. Yes Provider, Historical   omega-3 fatty acids-vitamin e (FISH OIL) 1,000 mg Cap Take 1 Cap by mouth. Yes Provider, Historical   calcium-cholecalciferol, D3, (CALCIUM 600 + D) tablet Take 1 Tab by mouth daily. Yes Provider, Historical   latanoprost (XALATAN) 0.005 % ophthalmic solution Administer 1 Drop to both eyes nightly.    Yes Provider, Historical   timolol (TIMOPTIC) 0.25 % ophthalmic solution Administer 1 Drop to both eyes two (2) times a day. 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  tetrahydrozoline HCl (VISINE OP) Apply  to eye daily.  amLODIPine (NORVASC) 2.5 mg tablet TAKE 1 TABLET BY MOUTH ONCE DAILY 90 Tab 1    losartan (COZAAR) 100 mg tablet TAKE 1 TABLET BY MOUTH ONCE DAILY 90 Tab 2    sotalol (BETAPACE) 80 mg tablet Take 1 Tab by mouth two (2) times a day.  Cholecalciferol, Vitamin D3, 1,000 unit cap Take  by mouth daily.  aspirin delayed-release 81 mg tablet Take  by mouth daily.  omega-3 fatty acids-vitamin e (FISH OIL) 1,000 mg Cap Take 1 Cap by mouth.  calcium-cholecalciferol, D3, (CALCIUM 600 + D) tablet Take 1 Tab by mouth daily.  latanoprost (XALATAN) 0.005 % ophthalmic solution Administer 1 Drop to both eyes nightly.  timolol (TIMOPTIC) 0.25 % ophthalmic solution Administer 1 Drop to both eyes two (2) times a day.        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 No flowsheet data found. Aide Moulton, who was evaluated through a patient-initiated, synchronous (real-time) audio only encounter, and/or her healthcare decision maker, is aware that it is a billable service, with coverage as determined by her insurance carrier. She provided verbal consent to proceed: Yes. She has not had a related appointment within my department in the past 7 days or scheduled within the next 24 hours.       Total Time: minutes: 21-30 minutes    Maria A Vasquez MD

## 2020-07-15 NOTE — PATIENT INSTRUCTIONS

## 2020-07-15 NOTE — PROGRESS NOTES
Harrison Chavez is a 80 y.o. female (: 1926) presenting to address:    Chief Complaint   Patient presents with   [facility] Visit       There were no vitals filed for this visit. Hearing/Vision:   No exam data present    Learning Assessment:     Learning Assessment 2016   PRIMARY LEARNER Patient   HIGHEST LEVEL OF EDUCATION - PRIMARY LEARNER  GRADUATED HIGH SCHOOL OR GED   BARRIERS PRIMARY LEARNER NONE   CO-LEARNER CAREGIVER No   PRIMARY LANGUAGE ENGLISH   LEARNER PREFERENCE PRIMARY DEMONSTRATION   ANSWERED BY self   RELATIONSHIP SELF     Depression Screening:     3 most recent PHQ Screens 7/15/2020   Little interest or pleasure in doing things Not at all   Feeling down, depressed, irritable, or hopeless Not at all   Total Score PHQ 2 0     Fall Risk Assessment:     Fall Risk Assessment, last 12 mths 7/15/2020   Able to walk? Yes   Fall in past 12 months? No   Fall with injury? -   Number of falls in past 12 months -     Abuse Screening:     Abuse Screening Questionnaire 7/15/2020   Do you ever feel afraid of your partner? N   Are you in a relationship with someone who physically or mentally threatens you? N   Is it safe for you to go home? Y     Coordination of Care Questionaire:   1. Have you been to the ER, urgent care clinic since your last visit? Hospitalized since your last visit? NO    2. Have you seen or consulted any other health care providers outside of the [address] [place] [address] since your last visit? Include any pap smears or colon screening. NO    Advanced Directive:   1. Do you have an Advanced Directive? NO    2. Would you like information on Advanced Directives?  NO

## 2020-12-21 ENCOUNTER — IMPORTED ENCOUNTER (OUTPATIENT)
Dept: URBAN - METROPOLITAN AREA CLINIC 1 | Facility: CLINIC | Age: 85
End: 2020-12-21

## 2020-12-21 PROBLEM — H04.123: Noted: 2020-12-21

## 2020-12-21 PROBLEM — H26.493: Noted: 2020-12-21

## 2020-12-21 PROBLEM — H40.1111: Noted: 2020-12-21

## 2020-12-21 PROBLEM — H16.143: Noted: 2020-12-21

## 2020-12-21 PROBLEM — H40.1121: Noted: 2020-12-21

## 2020-12-21 PROBLEM — H40.1112: Noted: 2020-12-21

## 2020-12-21 PROCEDURE — 92083 EXTENDED VISUAL FIELD XM: CPT

## 2020-12-21 PROCEDURE — 92014 COMPRE OPH EXAM EST PT 1/>: CPT

## 2020-12-21 NOTE — PATIENT DISCUSSION
1.  Moderate OD / Mild Open Angle Glaucoma OS -- (CD 0.85/0.8) HVF today showing early superior arc WNL OS. Stable on current gtts. Continue Cosopt BID OU and Latanoprost OU QHS. Patient advised to be compliant with gtts. 2.  PCO OU -- (Posterior Capsule Opacification) Observe 3. Pseudophakia OU 4. GR I Hypertensive Retinopathy OU - stable HTN Control. 5.  PETER w/ PEK OU -- Cont BID OU Routinely. 6.  Macular Drusen OU -- Stable observe. 7.  Anterior Blepharitis OU -- Begin Daily Hot compresses and lid scrubs were recommended. Erx'd Cosopt and latanoprost to [employer] @ pt request.Return for an appointment in 6 months for a 10/OCT with Dr. Kassandra Byers.

## 2021-01-21 RX ORDER — SOTALOL HYDROCHLORIDE 80 MG/1
80 TABLET ORAL 2 TIMES DAILY
Status: CANCELLED | OUTPATIENT
Start: 2021-01-21

## 2021-01-21 NOTE — TELEPHONE ENCOUNTER
Pharmacy called to request refill. Former Beverly Norton pt. ROXIE appt sched. Future Appointments   Date Time Provider Bhargavi Stauffer   1/27/2021 11:00 AM Nicole Bean NP [facility] BS AMB     Requested Prescriptions     Pending Prescriptions Disp Refills    sotaloL (BETAPACE) 80 mg tablet        Sig: Take 1 Tab by mouth two (2) times a day.

## 2021-01-22 NOTE — TELEPHONE ENCOUNTER
Last OV 7/15/20 . enter by Dr Suzette Stafford 1/9/19. I called patient to discuss medication because it has not been written by Dr Suzette Stafford . A message from 1/2019 says it was written by Dr Liss Asencio . Dr Ellis Yoder just entered as a med . She has some left and will discuss with Ca at visit next week because Dr Carson Rodriguez has left the practice he was at and she hasn't seen him .

## 2021-01-27 ENCOUNTER — OFFICE VISIT (OUTPATIENT)
Dept: FAMILY MEDICINE CLINIC | Age: 86
End: 2021-01-27
Payer: MEDICARE

## 2021-01-27 VITALS
WEIGHT: 109 LBS | TEMPERATURE: 97.5 F | RESPIRATION RATE: 16 BRPM | OXYGEN SATURATION: 96 % | BODY MASS INDEX: 20.06 KG/M2 | DIASTOLIC BLOOD PRESSURE: 67 MMHG | HEIGHT: 62 IN | HEART RATE: 50 BPM | SYSTOLIC BLOOD PRESSURE: 145 MMHG

## 2021-01-27 DIAGNOSIS — H91.90 HEARING LOSS, UNSPECIFIED HEARING LOSS TYPE, UNSPECIFIED LATERALITY: Primary | ICD-10-CM

## 2021-01-27 DIAGNOSIS — F32.A DEPRESSION, UNSPECIFIED DEPRESSION TYPE: ICD-10-CM

## 2021-01-27 DIAGNOSIS — I10 ESSENTIAL HYPERTENSION: ICD-10-CM

## 2021-01-27 DIAGNOSIS — I48.91 ATRIAL FIBRILLATION, UNSPECIFIED TYPE (HCC): ICD-10-CM

## 2021-01-27 PROCEDURE — 1090F PRES/ABSN URINE INCON ASSESS: CPT | Performed by: NURSE PRACTITIONER

## 2021-01-27 PROCEDURE — 99213 OFFICE O/P EST LOW 20 MIN: CPT | Performed by: NURSE PRACTITIONER

## 2021-01-27 PROCEDURE — G8427 DOCREV CUR MEDS BY ELIG CLIN: HCPCS | Performed by: NURSE PRACTITIONER

## 2021-01-27 PROCEDURE — 1101F PT FALLS ASSESS-DOCD LE1/YR: CPT | Performed by: NURSE PRACTITIONER

## 2021-01-27 PROCEDURE — G8536 NO DOC ELDER MAL SCRN: HCPCS | Performed by: NURSE PRACTITIONER

## 2021-01-27 PROCEDURE — G0463 HOSPITAL OUTPT CLINIC VISIT: HCPCS | Performed by: NURSE PRACTITIONER

## 2021-01-27 PROCEDURE — G8420 CALC BMI NORM PARAMETERS: HCPCS | Performed by: NURSE PRACTITIONER

## 2021-01-27 PROCEDURE — G8432 DEP SCR NOT DOC, RNG: HCPCS | Performed by: NURSE PRACTITIONER

## 2021-01-27 RX ORDER — SERTRALINE HYDROCHLORIDE 25 MG/1
25 TABLET, FILM COATED ORAL DAILY
Qty: 90 TAB | Refills: 0 | Status: SHIPPED | OUTPATIENT
Start: 2021-01-27 | End: 2021-04-27

## 2021-01-27 NOTE — PROGRESS NOTES
DORIS Hopkins is a 80y.o. year old female who presents today to establish care and for medication refill on her Sertraline. Her sister is also present and helping with health history as she is hard of hearing. Her depressive symptoms are well controlled on the current medication and dose. She denies any SI or HI. Her sister would also like for her to have a referral to ENT for her hearing. Patient states that she had a hearing evaluation once but that was a long time ago. She is overall feeling well without complaints. 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 , Disp: 90 Tab, Rfl: 0    losartan (COZAAR) 100 mg tablet, Take 1 tablet by mouth once daily, Disp: 90 Tab, Rfl: 1    sertraline (ZOLOFT) 25 mg tablet, Take 1 tablet by mouth once daily, Disp: 90 Tab, Rfl: 1    amLODIPine (NORVASC) 2.5 mg tablet, Take 1 tablet by mouth once daily, Disp: 90 Tab, Rfl: 1    dorzolamide (TRUSOPT) 2 % ophthalmic solution, Administer 2 Drops to both eyes three (3) times daily. , Disp: , Rfl:     tetrahydrozoline HCl (VISINE OP), Apply  to eye daily. , Disp: , Rfl:     sotalol (BETAPACE) 80 mg tablet, Take 1 Tab by mouth two (2) times a day., Disp: , Rfl:     Cholecalciferol, Vitamin D3, 1,000 unit cap, Take  by mouth daily. , Disp: , Rfl:     aspirin delayed-release 81 mg tablet, Take  by mouth daily. , Disp: , Rfl:     omega-3 fatty acids-vitamin e (FISH OIL) 1,000 mg Cap, Take 1 Cap by mouth., Disp: , Rfl:     calcium-cholecalciferol, D3, (CALCIUM 600 + D) tablet, Take 1 Tab by mouth daily. , Disp: , Rfl:     latanoprost (XALATAN) 0.005 % ophthalmic solution, Administer 1 Drop to both eyes nightly., Disp: , Rfl:     timolol (TIMOPTIC) 0.25 % ophthalmic solution, Administer 1 Drop to both eyes two (2) times a day., Disp: , Rfl:      No Known Allergies     Review of Systems   Constitutional: Negative for chills, fever, malaise/fatigue and weight loss. HENT: Negative for congestion, ear pain, hearing loss, sinus pain, sore throat and tinnitus. Eyes: Negative for blurred vision, double vision, photophobia and pain. Respiratory: Negative for cough and shortness of breath. Cardiovascular: Negative for chest pain, palpitations and leg swelling. Gastrointestinal: Negative for abdominal pain, constipation, diarrhea, heartburn, nausea and vomiting. Genitourinary: Negative for dysuria, frequency and urgency. Musculoskeletal: Negative for back pain, joint pain and myalgias. Skin: Negative for rash. Neurological: Negative for dizziness and headaches. Psychiatric/Behavioral: Negative for depression and suicidal ideas. The patient is not nervous/anxious. PE  BP (!) 145/67   Pulse (!) 50   Temp 97.5 °F (36.4 °C) (Temporal)   Resp 16   Ht 5' 2\" (1.575 m)   Wt 109 lb (49.4 kg)   SpO2 96%   BMI 19.94 kg/m²     Physical Exam  Vitals signs reviewed. Constitutional:       General: She is not in acute distress. Appearance: Normal appearance. HENT:      Head: Normocephalic and atraumatic. Ears:      Comments: Hard of hearing  Cardiovascular:      Rate and Rhythm: Normal rate and regular rhythm. Heart sounds: S1 normal and S2 normal. No murmur.  No friction rub. No gallop. No S3 or S4 sounds. Pulmonary:      Effort: Pulmonary effort is normal.      Breath sounds: Normal breath sounds. No wheezing, rhonchi or rales. Musculoskeletal:      Right lower leg: No edema. Left lower leg: No edema. Skin:     General: Skin is warm and dry. Capillary Refill: Capillary refill takes less than 2 seconds. Nails: There is no clubbing. Neurological:      Mental Status: She is alert and oriented to person, place, and time. Psychiatric:         Attention and Perception: Attention normal.         Mood and Affect: Mood normal.         Speech: Speech normal.         Behavior: Behavior normal.         Thought Content: Thought content normal.         Cognition and Memory: Cognition normal.         Judgment: Judgment normal.         Assessment/Plan  1. Depression  Sertraline refilled    2. [place]  Refer to ENT for hearing evaluation    3.  Preventive care  Fasting labs due in July  MWV due after July 15

## 2021-01-27 NOTE — PROGRESS NOTES
Tim Peterson is a 80 y.o. female (: 1926) presenting to address:    Chief Complaint   Patient presents with    Hypertension    Irregular Heart Beat       Vitals:    21 1057   BP: (!) 145/67   Pulse: (!) 50   Resp: 16   Temp: 97.5 °F (36.4 °C)   TempSrc: Temporal   SpO2: 96%   Weight: 109 lb (49.4 kg)   Height: 5' 2\" (1.575 m)   PainSc:   0 - No pain       Hearing/Vision:   No exam data present    Learning Assessment:     Learning Assessment 2016   PRIMARY LEARNER Patient   HIGHEST LEVEL OF EDUCATION - PRIMARY LEARNER  GRADUATED HIGH SCHOOL OR GED   BARRIERS PRIMARY LEARNER NONE   CO-LEARNER CAREGIVER No   PRIMARY LANGUAGE ENGLISH   LEARNER PREFERENCE PRIMARY DEMONSTRATION   ANSWERED BY self   RELATIONSHIP SELF     Depression Screening:     3 most recent PHQ Screens 7/15/2020   Little interest or pleasure in doing things Not at all   Feeling down, depressed, irritable, or hopeless Not at all   Total Score PHQ 2 0     Fall Risk Assessment:     Fall Risk Assessment, last 12 mths 7/15/2020   Able to walk? Yes   Fall in past 12 months? No   Number of falls in past 12 months -   Fall with injury? -     Abuse Screening:     Abuse Screening Questionnaire 7/15/2020   Do you ever feel afraid of your partner? N   Are you in a relationship with someone who physically or mentally threatens you? N   Is it safe for you to go home? Y     Coordination of Care Questionaire:   1. Have you been to the ER, urgent care clinic since your last visit? Hospitalized since your last visit? NO    2. Have you seen or consulted any other health care providers outside of the [address] [place] [address] since your last visit? Include any pap smears or colon screening. NO    Advanced Directive:   1. Do you have an Advanced Directive? NO    2. Would you like information on Advanced Directives?  NO

## 2022-04-02 ASSESSMENT — TONOMETRY
OS_IOP_MMHG: 11
OS_IOP_MMHG: 14
OD_IOP_MMHG: 12
OS_IOP_MMHG: 10
OD_IOP_MMHG: 13
OD_IOP_MMHG: 14
OD_IOP_MMHG: 11
OD_IOP_MMHG: 12
OS_IOP_MMHG: 13
OS_IOP_MMHG: 11
OS_IOP_MMHG: 12
OD_IOP_MMHG: 11

## 2022-04-02 ASSESSMENT — VISUAL ACUITY
OD_SC: 20/40+2
OD_SC: 20/25
OS_CC: J3
OD_SC: 20/40
OD_CC: J3
OS_SC: 20/25
OD_SC: 20/30
OS_SC: 20/30
OD_SC: 20/30
OS_SC: 20/30
OD_GLARE: 20/400
OS_SC: 20/40
OD_SC: 20/30
OS_SC: 20/40+2
OS_SC: 20/40
OS_GLARE: 20/400